# Patient Record
Sex: FEMALE | Race: WHITE | NOT HISPANIC OR LATINO | Employment: FULL TIME | URBAN - METROPOLITAN AREA
[De-identification: names, ages, dates, MRNs, and addresses within clinical notes are randomized per-mention and may not be internally consistent; named-entity substitution may affect disease eponyms.]

---

## 2017-04-14 ENCOUNTER — APPOINTMENT (OUTPATIENT)
Dept: WOMENS IMAGING | Facility: HOSPITAL | Age: 62
End: 2017-04-14

## 2017-04-14 PROCEDURE — 77063 BREAST TOMOSYNTHESIS BI: CPT | Performed by: RADIOLOGY

## 2017-04-14 PROCEDURE — 77067 SCR MAMMO BI INCL CAD: CPT | Performed by: RADIOLOGY

## 2017-10-06 ENCOUNTER — OFFICE VISIT (OUTPATIENT)
Dept: INTERNAL MEDICINE | Facility: CLINIC | Age: 62
End: 2017-10-06

## 2017-10-06 VITALS
RESPIRATION RATE: 16 BRPM | WEIGHT: 124 LBS | HEART RATE: 77 BPM | TEMPERATURE: 98.3 F | DIASTOLIC BLOOD PRESSURE: 52 MMHG | SYSTOLIC BLOOD PRESSURE: 102 MMHG | OXYGEN SATURATION: 92 % | HEIGHT: 66 IN | BODY MASS INDEX: 19.93 KG/M2

## 2017-10-06 DIAGNOSIS — E55.9 VITAMIN D DEFICIENCY: ICD-10-CM

## 2017-10-06 DIAGNOSIS — Z11.59 NEED FOR HEPATITIS C SCREENING TEST: ICD-10-CM

## 2017-10-06 DIAGNOSIS — Z12.11 SCREEN FOR COLON CANCER: ICD-10-CM

## 2017-10-06 DIAGNOSIS — Z00.00 ENCOUNTER FOR ANNUAL HEALTH EXAMINATION: Primary | ICD-10-CM

## 2017-10-06 LAB
BILIRUB BLD-MCNC: NEGATIVE MG/DL
CLARITY, POC: CLEAR
COLOR UR: YELLOW
GLUCOSE UR STRIP-MCNC: NEGATIVE MG/DL
KETONES UR QL: NEGATIVE
LEUKOCYTE EST, POC: NEGATIVE
NITRITE UR-MCNC: NEGATIVE MG/ML
PH UR: 6 [PH] (ref 5–8)
PROT UR STRIP-MCNC: NEGATIVE MG/DL
RBC # UR STRIP: NEGATIVE /UL
SP GR UR: 1.02 (ref 1–1.03)
UROBILINOGEN UR QL: NORMAL

## 2017-10-06 PROCEDURE — 99396 PREV VISIT EST AGE 40-64: CPT | Performed by: INTERNAL MEDICINE

## 2017-10-06 PROCEDURE — 81003 URINALYSIS AUTO W/O SCOPE: CPT | Performed by: INTERNAL MEDICINE

## 2017-10-06 NOTE — PROGRESS NOTES
Subjective   Sonia Basilio is a 62 y.o. female.     Chief Complaint   Patient presents with   • Annual Exam         HPI Comments: In for annual preventative exam.  Overall she feels pretty well.  Energy is good.  She still little lagged from her trip to [place].  He exercises daily either walking or bicycling.  Sleeps about 6 hours per night.  Somewhat interrupted and she is usually up twice.  Occasional hot flashes.       The following portions of the patient's history were reviewed and updated as appropriate: allergies, current medications, past social history and problem list.    HISTORY  Outpatient Prescriptions Marked as Taking for the 10/6/17 encounter (Office Visit) with Edwadr Maloney MD   Medication Sig Dispense Refill   • Cholecalciferol (VITAMIN D3) 5000 UNITS capsule capsule Take 5,000 Units by mouth daily.     • Estradiol (ESTROGEL) 0.75 MG/1.25 GM (0.06%) topical gel Place 1.25 g on the skin daily.     • Multiple Vitamins-Minerals (MULTIVITAMIN & MINERAL PO) Take  by mouth daily.     • Omega-3 Fatty Acids (FISH OIL) 1000 MG capsule capsule Take  by mouth daily with breakfast.     • progesterone (PROMETRIUM) 200 MG capsule Take 200 mg by mouth daily.       Social History     Social History   • Marital status:      Spouse name: N/A   • Number of children: N/A   • Years of education: N/A     Occupational History   • Not on file.     Social History Main Topics   • Smoking status: Never Smoker   • Smokeless tobacco: Not on file   • Alcohol use Not on file      Comment: RARE   • Drug use: Not on file   • Sexual activity: Not on file     Other Topics Concern   • Not on file     Social History Narrative     Family History   Problem Relation Age of Onset   • Stroke Mother    • Dementia Mother    • Atrial fibrillation Sister    • Hyperlipidemia Sister    • Heart disease Sister    • Alcohol abuse Son      Past Medical History:   Diagnosis Date   • Allergic rhinitis    • H/O degenerative disc disease     • Medication management    • MVP (mitral valve prolapse)    • Osteopenia    • Physical exam, annual    • Sciatica    • Vitamin D deficiency      Past Surgical History:   Procedure Laterality Date   • BUNIONECTOMY Right 06/2015   • DILATATION AND CURETTAGE     • FOOT SURGERY  06/13/2016       Review of Systems   Constitutional: Negative for appetite change, chills, fatigue and fever.   HENT: Negative for congestion, ear pain, hearing loss, nosebleeds, postnasal drip, rhinorrhea, sinus pressure and trouble swallowing.    Eyes: Negative for pain, itching and visual disturbance.   Respiratory: Negative for cough, chest tightness, shortness of breath and wheezing.    Cardiovascular: Negative for chest pain, palpitations and leg swelling.   Gastrointestinal: Negative for abdominal pain, anal bleeding, constipation, diarrhea, nausea, rectal pain and vomiting.   Endocrine: Negative for cold intolerance, heat intolerance and polyuria.   Genitourinary: Negative for difficulty urinating, dysuria, flank pain, frequency, hematuria and urgency.   Musculoskeletal: Positive for arthralgias. Negative for back pain (fingers) and myalgias.   Skin: Negative for rash.   Allergic/Immunologic: Negative for environmental allergies.   Neurological: Negative for dizziness, syncope, speech difficulty, weakness, light-headedness, numbness and headaches.   Hematological: Does not bruise/bleed easily.   Psychiatric/Behavioral: Negative for agitation, confusion and sleep disturbance. The patient is not nervous/anxious.        Objective   Vitals:    10/06/17 1422   BP: 102/52   Pulse: 77   Resp: 16   Temp: 98.3 °F (36.8 °C)   SpO2: 92%      Last Weight    10/06/17  1422   Weight: 124 lb (56.2 kg)    [unfilled]  Body mass index is 20.01 kg/(m^2).      Physical Exam   Constitutional: She is oriented to person, place, and time. She appears well-developed and well-nourished.   HENT:   Head: Normocephalic and atraumatic.   Right Ear: External ear  normal.   Left Ear: External ear normal.   Nose: Nose normal.   Mouth/Throat: Oropharynx is clear and moist.   Eyes: Conjunctivae and EOM are normal. Pupils are equal, round, and reactive to light.   Neck: Normal range of motion. Neck supple. No JVD present. No thyromegaly present.   Cardiovascular: Normal rate, regular rhythm, normal heart sounds and intact distal pulses.  Exam reveals no gallop.    No murmur heard.  Pulmonary/Chest: Effort normal and breath sounds normal. No respiratory distress. She has no wheezes. She has no rales.   Abdominal: Soft. Bowel sounds are normal. She exhibits no distension and no mass. There is no tenderness. There is no guarding. No hernia.   Musculoskeletal: Normal range of motion. She exhibits no edema.   Lymphadenopathy:     She has no cervical adenopathy.   Neurological: She is alert and oriented to person, place, and time. She displays normal reflexes. No cranial nerve deficit. Coordination normal.   Skin: Skin is warm and dry.   Psychiatric: She has a normal mood and affect. Her behavior is normal. Judgment and thought content normal.   Nursing note and vitals reviewed.        Problem List Items Addressed This Visit     None      Visit Diagnoses     Encounter for annual health examination    -  Primary    Relevant Orders    POCT urinalysis dipstick, automated (Completed)        Assessment/Plan   In for annual exam today.  Overall she looks great.  Annual labs with health screen included lipids and glucose.   She exercises daily mainly with walking.  We'll check a CBC CMP lipids urinalysis and vitamin D today.  Hepatitis C antibody.  Follow-up 1 year.  Due for colonoscopy.  Recommended zoster today.  Follow-up one year with annual preventative exam.  She has occasional periods where her heart seems to beat harder faster.  With her mitral valve prolapse this is likely atrial arrhythmias.  Advised we can start her on the beta blocker she would like or symptoms become  karen.         Dragon disclaimer:   Much of this encounter note is an electronic transcription/translation of spoken language to printed text. The electronic translation of spoken language may permit erroneous, or at times, nonsensical words or phrases to be inadvertently transcribed; Although I have reviewed the note for such errors, some may still exist.

## 2017-10-07 LAB
25(OH)D3+25(OH)D2 SERPL-MCNC: 72.8 NG/ML (ref 30–100)
ALBUMIN SERPL-MCNC: 4.5 G/DL (ref 3.5–5.2)
ALBUMIN/GLOB SERPL: 1.8 G/DL
ALP SERPL-CCNC: 56 U/L (ref 39–117)
ALT SERPL-CCNC: 14 U/L (ref 1–33)
AST SERPL-CCNC: 18 U/L (ref 1–32)
BASOPHILS # BLD AUTO: 0.02 10*3/MM3 (ref 0–0.2)
BASOPHILS NFR BLD AUTO: 0.3 % (ref 0–1.5)
BILIRUB SERPL-MCNC: 0.6 MG/DL (ref 0.1–1.2)
BUN SERPL-MCNC: 15 MG/DL (ref 8–23)
BUN/CREAT SERPL: 18.3 (ref 7–25)
CALCIUM SERPL-MCNC: 9.6 MG/DL (ref 8.6–10.5)
CHLORIDE SERPL-SCNC: 98 MMOL/L (ref 98–107)
CO2 SERPL-SCNC: 24.4 MMOL/L (ref 22–29)
CREAT SERPL-MCNC: 0.82 MG/DL (ref 0.57–1)
EOSINOPHIL # BLD AUTO: 0.07 10*3/MM3 (ref 0–0.7)
EOSINOPHIL NFR BLD AUTO: 1.1 % (ref 0.3–6.2)
ERYTHROCYTE [DISTWIDTH] IN BLOOD BY AUTOMATED COUNT: 12.9 % (ref 11.7–13)
GLOBULIN SER CALC-MCNC: 2.5 GM/DL
GLUCOSE SERPL-MCNC: 85 MG/DL (ref 65–99)
HCT VFR BLD AUTO: 40.9 % (ref 35.6–45.5)
HCV AB S/CO SERPL IA: <0.1 S/CO RATIO (ref 0–0.9)
HGB BLD-MCNC: 13.2 G/DL (ref 11.9–15.5)
IMM GRANULOCYTES # BLD: 0 10*3/MM3 (ref 0–0.03)
IMM GRANULOCYTES NFR BLD: 0 % (ref 0–0.5)
LYMPHOCYTES # BLD AUTO: 1.71 10*3/MM3 (ref 0.9–4.8)
LYMPHOCYTES NFR BLD AUTO: 28 % (ref 19.6–45.3)
MCH RBC QN AUTO: 30.6 PG (ref 26.9–32)
MCHC RBC AUTO-ENTMCNC: 32.3 G/DL (ref 32.4–36.3)
MCV RBC AUTO: 94.9 FL (ref 80.5–98.2)
MONOCYTES # BLD AUTO: 0.67 10*3/MM3 (ref 0.2–1.2)
MONOCYTES NFR BLD AUTO: 11 % (ref 5–12)
NEUTROPHILS # BLD AUTO: 3.64 10*3/MM3 (ref 1.9–8.1)
NEUTROPHILS NFR BLD AUTO: 59.6 % (ref 42.7–76)
PLATELET # BLD AUTO: 213 10*3/MM3 (ref 140–500)
POTASSIUM SERPL-SCNC: 4 MMOL/L (ref 3.5–5.2)
PROT SERPL-MCNC: 7 G/DL (ref 6–8.5)
RBC # BLD AUTO: 4.31 10*6/MM3 (ref 3.9–5.2)
SODIUM SERPL-SCNC: 139 MMOL/L (ref 136–145)
WBC # BLD AUTO: 6.11 10*3/MM3 (ref 4.5–10.7)

## 2017-12-29 ENCOUNTER — OFFICE VISIT (OUTPATIENT)
Dept: INTERNAL MEDICINE | Facility: CLINIC | Age: 62
End: 2017-12-29

## 2017-12-29 VITALS
HEART RATE: 81 BPM | RESPIRATION RATE: 16 BRPM | BODY MASS INDEX: 19.61 KG/M2 | WEIGHT: 122 LBS | OXYGEN SATURATION: 95 % | SYSTOLIC BLOOD PRESSURE: 112 MMHG | TEMPERATURE: 99.1 F | HEIGHT: 66 IN | DIASTOLIC BLOOD PRESSURE: 62 MMHG

## 2017-12-29 DIAGNOSIS — R68.89 FLU-LIKE SYMPTOMS: Primary | ICD-10-CM

## 2017-12-29 PROCEDURE — 99213 OFFICE O/P EST LOW 20 MIN: CPT | Performed by: INTERNAL MEDICINE

## 2017-12-29 RX ORDER — BENZONATATE 200 MG/1
200 CAPSULE ORAL 3 TIMES DAILY PRN
Qty: 30 CAPSULE | Refills: 0 | Status: SHIPPED | OUTPATIENT
Start: 2017-12-29 | End: 2018-01-31

## 2017-12-29 NOTE — PROGRESS NOTES
Subjective   Sonia Basilio is a 62 y.o. female.     Chief Complaint   Patient presents with   • Cough   • Nasal Congestion         Cough   This is a new problem. The current episode started in the past 7 days. The problem has been unchanged. The problem occurs every few hours. The cough is productive of sputum. Associated symptoms include chills, a fever, headaches, myalgias, nasal congestion, postnasal drip, rhinorrhea and a sore throat. Pertinent negatives include no shortness of breath. Nothing aggravates the symptoms. She has tried OTC cough suppressant for the symptoms. The treatment provided no relief. There is no history of asthma, COPD or emphysema.        The following portions of the patient's history were reviewed and updated as appropriate: allergies, current medications, past social history and problem list.    Outpatient Prescriptions Marked as Taking for the 12/29/17 encounter (Office Visit) with Edward Maloney MD   Medication Sig Dispense Refill   • Cholecalciferol (VITAMIN D3) 5000 UNITS capsule capsule Take 5,000 Units by mouth daily.     • Estradiol (ESTROGEL) 0.75 MG/1.25 GM (0.06%) topical gel Place 1.25 g on the skin daily.     • Multiple Vitamins-Minerals (MULTIVITAMIN & MINERAL PO) Take  by mouth daily.     • Omega-3 Fatty Acids (FISH OIL) 1000 MG capsule capsule Take  by mouth daily with breakfast.     • progesterone (PROMETRIUM) 200 MG capsule Take 200 mg by mouth daily.         Review of Systems   Constitutional: Positive for chills and fever.   HENT: Positive for postnasal drip, rhinorrhea and sore throat.    Respiratory: Positive for cough. Negative for shortness of breath.    Musculoskeletal: Positive for myalgias.   Neurological: Positive for headaches.       Objective   Vitals:    12/29/17 1145   BP: 112/62   Pulse: 81   Resp: 16   Temp: 99.1 °F (37.3 °C)   SpO2: 95%      Last Weight    12/29/17  1145   Weight: 55.3 kg (122 lb)    [unfilled]  Body mass index is 19.7  kg/(m^2).      Physical Exam   Constitutional: She appears well-developed and well-nourished.   HENT:   Head: Normocephalic and atraumatic.   Right Ear: External ear normal.   Left Ear: External ear normal.   Nose: Nose normal.   Mouth/Throat: Oropharynx is clear and moist.   Eyes: Conjunctivae are normal. Pupils are equal, round, and reactive to light.   Pulmonary/Chest: Effort normal and breath sounds normal. No respiratory distress. She has no wheezes. She has no rales.   Skin: Skin is warm and dry.         Problem List Items Addressed This Visit     None      Visit Diagnoses     Flu-like symptoms    -  Primary        Assessment/Plan   In with 1 week illness.  Cough.  Head congestion.  Sore throat.  Headache and body aches.  This sounds like a flulike illness.  Been taking TheraFlu at home.  We'll continue with OTCs.  Tessalon cough perles when necessary.  Reminded her that she is due for her colonoscopy.         Dragon disclaimer:   Much of this encounter note is an electronic transcription/translation of spoken language to printed text. The electronic translation of spoken language may permit erroneous, or at times, nonsensical words or phrases to be inadvertently transcribed; Although I have reviewed the note for such errors, some may still exist.

## 2018-01-30 ENCOUNTER — PREP FOR SURGERY (OUTPATIENT)
Dept: OTHER | Facility: HOSPITAL | Age: 63
End: 2018-01-30

## 2018-01-30 DIAGNOSIS — Z12.11 SCREENING FOR COLON CANCER: Primary | ICD-10-CM

## 2018-01-31 ENCOUNTER — OFFICE VISIT (OUTPATIENT)
Dept: INTERNAL MEDICINE | Facility: CLINIC | Age: 63
End: 2018-01-31

## 2018-01-31 VITALS
HEART RATE: 78 BPM | HEIGHT: 66 IN | OXYGEN SATURATION: 97 % | WEIGHT: 121.8 LBS | DIASTOLIC BLOOD PRESSURE: 60 MMHG | SYSTOLIC BLOOD PRESSURE: 96 MMHG | RESPIRATION RATE: 16 BRPM | TEMPERATURE: 97.7 F | BODY MASS INDEX: 19.58 KG/M2

## 2018-01-31 DIAGNOSIS — B07.0 PLANTAR WART: Primary | ICD-10-CM

## 2018-01-31 PROCEDURE — 99212 OFFICE O/P EST SF 10 MIN: CPT | Performed by: INTERNAL MEDICINE

## 2018-01-31 NOTE — PROGRESS NOTES
Subjective   Sonia Basiilo is a 62 y.o. female.     Chief Complaint   Patient presents with   • Callouses         HPI Comments: In with a wartlike lesion on the bullae or pad of the right second toe.  Symptoms for over one month.  She's had previous bunion surgery on that foot.  Wears a spacer between the great toe and the second toe.       The following portions of the patient's history were reviewed and updated as appropriate: allergies, current medications, past social history and problem list.    Outpatient Prescriptions Marked as Taking for the 1/31/18 encounter (Office Visit) with Edward Maloney MD   Medication Sig Dispense Refill   • Cholecalciferol (VITAMIN D3) 5000 UNITS capsule capsule Take 5,000 Units by mouth daily.     • Estradiol (ESTROGEL) 0.75 MG/1.25 GM (0.06%) topical gel Place 1.25 g on the skin daily.     • Multiple Vitamins-Minerals (MULTIVITAMIN & MINERAL PO) Take  by mouth daily.     • Omega-3 Fatty Acids (FISH OIL) 1000 MG capsule capsule Take  by mouth daily with breakfast.     • progesterone (PROMETRIUM) 200 MG capsule Take 200 mg by mouth daily.         Review of Systems   Constitutional: Negative for chills and fever.   Skin: Positive for rash.       Objective   Vitals:    01/31/18 1017   BP: 96/60   Pulse: 78   Resp: 16   Temp: 97.7 °F (36.5 °C)   SpO2: 97%      Last Weight    01/31/18  1017   Weight: 55.2 kg (121 lb 12.8 oz)    [unfilled]  Body mass index is 19.67 kg/(m^2).      Physical Exam   Constitutional: She appears well-developed and well-nourished.      Skin Integrity  -  She has callous right foot..  Skin: Skin is warm and dry.         Problem List Items Addressed This Visit     None      Visit Diagnoses     Plantar wart    -  Primary        Assessment/Plan   In with a callus-like lesion on the volar pad of the right second toe proximally.  Really not an area that should be seeing friction.  I wonder if this is a plantar wart.  Does have some calluses on the MT heads  and medial to the right great toes along with dryness I think that's a separate issue.  She's had previous bunion surgery.  Wears a spacer between the great toe and second toe.  Patient uses her dermatologist for evaluation of whether this is a wart.  She has no appointment in about 4 weeks.  She also has no appointment for her colonoscopy.         Dragon disclaimer:   Much of this encounter note is an electronic transcription/translation of spoken language to printed text. The electronic translation of spoken language may permit erroneous, or at times, nonsensical words or phrases to be inadvertently transcribed; Although I have reviewed the note for such errors, some may still exist.

## 2018-02-01 PROBLEM — Z12.11 SCREENING FOR COLON CANCER: Status: ACTIVE | Noted: 2018-02-01

## 2018-04-03 ENCOUNTER — ANESTHESIA (OUTPATIENT)
Dept: GASTROENTEROLOGY | Facility: HOSPITAL | Age: 63
End: 2018-04-03

## 2018-04-03 ENCOUNTER — HOSPITAL ENCOUNTER (OUTPATIENT)
Facility: HOSPITAL | Age: 63
Setting detail: HOSPITAL OUTPATIENT SURGERY
Discharge: HOME | End: 2018-04-03
Attending: INTERNAL MEDICINE | Admitting: INTERNAL MEDICINE

## 2018-04-03 ENCOUNTER — ANESTHESIA EVENT (OUTPATIENT)
Dept: GASTROENTEROLOGY | Facility: HOSPITAL | Age: 63
End: 2018-04-03

## 2018-04-03 VITALS
HEART RATE: 72 BPM | DIASTOLIC BLOOD PRESSURE: 62 MMHG | RESPIRATION RATE: 16 BRPM | OXYGEN SATURATION: 100 % | WEIGHT: 126 LBS | SYSTOLIC BLOOD PRESSURE: 118 MMHG | HEIGHT: 67 IN | TEMPERATURE: 98 F | BODY MASS INDEX: 19.78 KG/M2

## 2018-04-03 DIAGNOSIS — Z12.11 SCREENING FOR COLON CANCER: ICD-10-CM

## 2018-04-03 PROCEDURE — S0260 H&P FOR SURGERY: HCPCS | Performed by: INTERNAL MEDICINE

## 2018-04-03 PROCEDURE — 45381 COLONOSCOPY SUBMUCOUS NJX: CPT | Performed by: INTERNAL MEDICINE

## 2018-04-03 PROCEDURE — 88305 TISSUE EXAM BY PATHOLOGIST: CPT | Performed by: INTERNAL MEDICINE

## 2018-04-03 PROCEDURE — 45380 COLONOSCOPY AND BIOPSY: CPT | Performed by: INTERNAL MEDICINE

## 2018-04-03 PROCEDURE — 25010000002 PROPOFOL 10 MG/ML EMULSION: Performed by: NURSE ANESTHETIST, CERTIFIED REGISTERED

## 2018-04-03 PROCEDURE — 45385 COLONOSCOPY W/LESION REMOVAL: CPT | Performed by: INTERNAL MEDICINE

## 2018-04-03 RX ORDER — PROPOFOL 10 MG/ML
VIAL (ML) INTRAVENOUS AS NEEDED
Status: DISCONTINUED | OUTPATIENT
Start: 2018-04-03 | End: 2018-04-03 | Stop reason: SURG

## 2018-04-03 RX ORDER — PROPOFOL 10 MG/ML
VIAL (ML) INTRAVENOUS CONTINUOUS PRN
Status: DISCONTINUED | OUTPATIENT
Start: 2018-04-03 | End: 2018-04-03 | Stop reason: SURG

## 2018-04-03 RX ORDER — SODIUM CHLORIDE 0.9 % (FLUSH) 0.9 %
1-10 SYRINGE (ML) INJECTION AS NEEDED
Status: DISCONTINUED | OUTPATIENT
Start: 2018-04-03 | End: 2018-04-03 | Stop reason: HOSPADM

## 2018-04-03 RX ORDER — SODIUM CHLORIDE, SODIUM LACTATE, POTASSIUM CHLORIDE, CALCIUM CHLORIDE 600; 310; 30; 20 MG/100ML; MG/100ML; MG/100ML; MG/100ML
30 INJECTION, SOLUTION INTRAVENOUS CONTINUOUS PRN
Status: DISCONTINUED | OUTPATIENT
Start: 2018-04-03 | End: 2018-04-03 | Stop reason: HOSPADM

## 2018-04-03 RX ADMIN — PROPOFOL 200 MCG/KG/MIN: 10 INJECTION, EMULSION INTRAVENOUS at 08:33

## 2018-04-03 RX ADMIN — PROPOFOL 50 MG: 10 INJECTION, EMULSION INTRAVENOUS at 08:33

## 2018-04-03 RX ADMIN — SODIUM CHLORIDE, POTASSIUM CHLORIDE, SODIUM LACTATE AND CALCIUM CHLORIDE 30 ML/HR: 600; 310; 30; 20 INJECTION, SOLUTION INTRAVENOUS at 07:59

## 2018-04-03 RX ADMIN — SODIUM CHLORIDE, POTASSIUM CHLORIDE, SODIUM LACTATE AND CALCIUM CHLORIDE: 600; 310; 30; 20 INJECTION, SOLUTION INTRAVENOUS at 08:30

## 2018-04-03 NOTE — ANESTHESIA POSTPROCEDURE EVALUATION
"Patient: Sonia Basilio    Procedure Summary     Date:  04/03/18 Room / Location:  [facility] ENDOSCOPY 10 /  [facility]    Anesthesia Start:  0830 Anesthesia Stop:  0902    Procedure:  COLONOSCOPY WITH POLYPECTOMY (HOT SNARE) (N/A ) Diagnosis:       Screening for colon cancer      (Screening for colon cancer [Z12.11])    Surgeon:  Naina Pandya MD Provider:  Yeyo Rosario MD    Anesthesia Type:  MAC ASA Status:  2          Anesthesia Type: MAC  Last vitals  BP   126/66 (04/03/18 0749)   Temp   36.4 °C (97.5 °F) (04/03/18 0749)   Pulse   59 (04/03/18 0749)   Resp   18 (04/03/18 0749)     SpO2   100 % (04/03/18 0749)     Post Anesthesia Care and Evaluation    Patient location during evaluation: PACU  Patient participation: complete - patient participated  Level of consciousness: awake  Pain score: 0  Pain management: adequate  Airway patency: patent  Anesthetic complications: No anesthetic complications    Cardiovascular status: acceptable  Respiratory status: acceptable  Hydration status: acceptable    Comments: Blood pressure 126/66, pulse 59, temperature 36.4 °C (97.5 °F), temperature source Oral, resp. rate 18, height 170.2 cm (67\"), weight 57.2 kg (126 lb), SpO2 100 %.    No anesthesia care post op    "

## 2018-04-03 NOTE — ANESTHESIA PREPROCEDURE EVALUATION
Anesthesia Evaluation     Patient summary reviewed                Airway   Mallampati: III  TM distance: >3 FB  Neck ROM: full  No difficulty expected  Dental - normal exam     Pulmonary     breath sounds clear to auscultation  Cardiovascular   Exercise tolerance: good (4-7 METS)    Rhythm: regular  Rate: normal        Neuro/Psych  GI/Hepatic/Renal/Endo      Musculoskeletal     Abdominal    Substance History      OB/GYN          Other                      Anesthesia Plan    ASA 2     MAC     intravenous induction   Anesthetic plan and risks discussed with patient.

## 2018-04-03 NOTE — H&P
[facility]  Pre Procedure History & Physical    Chief Complaint:   Screening - FH polyps, second degree relative with CRC    Subjective     HPI:   64 yo WF for screening c/s - her paternal GM had CRC in her 70s.  Her father had polyps.  She denies any GI symptoms.  Last c/s 2006 was nml    Past Medical History:   Past Medical History:   Diagnosis Date   • Allergic rhinitis    • H/O degenerative disc disease    • Medication management    • MVP (mitral valve prolapse)    • Osteopenia    • Physical exam, annual    • Sciatica    • Vitamin D deficiency        Past Surgical History:  Past Surgical History:   Procedure Laterality Date   • BUNIONECTOMY Right 06/2015   • DILATATION AND CURETTAGE         Family History:  Family History   Problem Relation Age of Onset   • Stroke Mother    • Dementia Mother    • Atrial fibrillation Sister    • Hyperlipidemia Sister    • Heart disease Sister    • Alcohol abuse Son    • Colon cancer Paternal Grandmother        Social History:   reports that she has never smoked. She has never used smokeless tobacco. She reports that she does not drink alcohol or use drugs.    Medications:   Prescriptions Prior to Admission   Medication Sig Dispense Refill Last Dose   • Cholecalciferol (VITAMIN D3) 5000 UNITS capsule capsule Take 5,000 Units by mouth daily.   Past Week at Unknown time   • Estradiol (ESTROGEL) 0.75 MG/1.25 GM (0.06%) topical gel Place 1.25 g on the skin daily.   Past Week at Unknown time   • Multiple Vitamins-Minerals (MULTIVITAMIN & MINERAL PO) Take  by mouth daily.   Past Week at Unknown time   • Omega-3 Fatty Acids (FISH OIL) 1000 MG capsule capsule Take  by mouth daily with breakfast.   Past Week at Unknown time   • progesterone (PROMETRIUM) 200 MG capsule Take 200 mg by mouth daily.   Past Week at Unknown time       Allergies:  Sulfa antibiotics    ROS:    Pertinent items are noted in HPI, all other systems reviewed and negative     Objective     Blood  "pressure 126/66, pulse 59, temperature 97.5 °F (36.4 °C), temperature source Oral, resp. rate 18, height 170.2 cm (67\"), weight 57.2 kg (126 lb), SpO2 100 %.    Physical Exam   Constitutional: Pt is oriented to person, place, and time and well-developed, well-nourished, and in no distress.   Mouth/Throat: Oropharynx is clear and moist.   Neck: Normal range of motion.   Cardiovascular: Normal rate, regular rhythm   Pulmonary/Chest: Effort normal  Abdominal: Soft. Nontender  Skin: Skin is warm and dry.   Psychiatric: Mood, memory, affect and judgment normal.     Assessment/Plan     Diagnosis:  Screening - FH polyps, second degree relative with CRC    Anticipated Surgical Procedure:  colonoscopy    The risks, benefits, and alternatives of this procedure have been discussed with the patient or the responsible party- the patient understands and agrees to proceed.                                                            "

## 2018-04-04 LAB
LAB AP CASE REPORT: NORMAL
Lab: NORMAL
PATH REPORT.FINAL DX SPEC: NORMAL
PATH REPORT.GROSS SPEC: NORMAL

## 2018-04-16 ENCOUNTER — TELEPHONE (OUTPATIENT)
Dept: GASTROENTEROLOGY | Facility: CLINIC | Age: 63
End: 2018-04-16

## 2018-04-20 ENCOUNTER — APPOINTMENT (OUTPATIENT)
Dept: WOMENS IMAGING | Facility: HOSPITAL | Age: 63
End: 2018-04-20

## 2018-04-20 PROCEDURE — 77067 SCR MAMMO BI INCL CAD: CPT | Performed by: RADIOLOGY

## 2018-05-15 ENCOUNTER — OFFICE VISIT (OUTPATIENT)
Dept: INTERNAL MEDICINE | Facility: CLINIC | Age: 63
End: 2018-05-15

## 2018-05-15 VITALS
BODY MASS INDEX: 19.3 KG/M2 | WEIGHT: 123 LBS | TEMPERATURE: 98.4 F | OXYGEN SATURATION: 96 % | DIASTOLIC BLOOD PRESSURE: 52 MMHG | HEART RATE: 81 BPM | SYSTOLIC BLOOD PRESSURE: 84 MMHG | HEIGHT: 67 IN | RESPIRATION RATE: 16 BRPM

## 2018-05-15 DIAGNOSIS — R35.0 URINARY FREQUENCY: Primary | ICD-10-CM

## 2018-05-15 LAB
BILIRUB BLD-MCNC: NEGATIVE MG/DL
CLARITY, POC: CLEAR
COLOR UR: YELLOW
GLUCOSE UR STRIP-MCNC: NEGATIVE MG/DL
KETONES UR QL: NEGATIVE
LEUKOCYTE EST, POC: ABNORMAL
NITRITE UR-MCNC: NEGATIVE MG/ML
PH UR: 6 [PH] (ref 5–8)
PROT UR STRIP-MCNC: NEGATIVE MG/DL
RBC # UR STRIP: ABNORMAL /UL
SP GR UR: 1.01 (ref 1–1.03)
UROBILINOGEN UR QL: ABNORMAL

## 2018-05-15 PROCEDURE — 81003 URINALYSIS AUTO W/O SCOPE: CPT | Performed by: INTERNAL MEDICINE

## 2018-05-15 PROCEDURE — 99213 OFFICE O/P EST LOW 20 MIN: CPT | Performed by: INTERNAL MEDICINE

## 2018-05-15 RX ORDER — CEPHALEXIN 500 MG/1
500 CAPSULE ORAL 3 TIMES DAILY
Qty: 15 CAPSULE | Refills: 0 | Status: SHIPPED | OUTPATIENT
Start: 2018-05-15 | End: 2018-07-16

## 2018-05-15 NOTE — PROGRESS NOTES
Subjective   Sonia Basilio is a 63 y.o. female.     Chief Complaint   Patient presents with   • Difficulty Urinating     urinary frequency last night         Difficulty Urinating   This is a new problem. The current episode started yesterday. The problem occurs intermittently. The problem has been unchanged. Associated symptoms include urinary symptoms. Pertinent negatives include no chills, fever, nausea or vomiting. Nothing aggravates the symptoms. She has tried nothing for the symptoms.        The following portions of the patient's history were reviewed and updated as appropriate: allergies, current medications, past social history and problem list.    Outpatient Prescriptions Marked as Taking for the 5/15/18 encounter (Office Visit) with Edward Maloney MD   Medication Sig Dispense Refill   • Cholecalciferol (VITAMIN D3) 5000 UNITS capsule capsule Take 5,000 Units by mouth daily.     • Estradiol (ESTROGEL) 0.75 MG/1.25 GM (0.06%) topical gel Place 1.25 g on the skin daily.     • Multiple Vitamins-Minerals (MULTIVITAMIN & MINERAL PO) Take  by mouth daily.     • Omega-3 Fatty Acids (FISH OIL) 1000 MG capsule capsule Take  by mouth daily with breakfast.     • progesterone (PROMETRIUM) 200 MG capsule Take 200 mg by mouth daily.         Review of Systems   Constitutional: Negative for chills and fever.   Gastrointestinal: Negative for nausea and vomiting.   Genitourinary: Positive for difficulty urinating, dysuria, frequency and urgency.       Objective   Vitals:    05/15/18 1047   BP: (!) 84/52   Pulse: 81   Resp: 16   Temp: 98.4 °F (36.9 °C)   SpO2: 96%      1    05/15/18  1047   Weight: 55.8 kg (123 lb)    [unfilled]  Body mass index is 19.26 kg/m².      Physical Exam   Constitutional: She appears well-developed and well-nourished.   HENT:   Head: Normocephalic and atraumatic.   Pulmonary/Chest: Effort normal and breath sounds normal.   Abdominal: Soft. Bowel sounds are normal. She exhibits no  distension. There is no tenderness. There is no rebound and no guarding.   Skin: Skin is warm and dry.         Problem List Items Addressed This Visit     None      Visit Diagnoses     Urinary frequency    -  Primary    Relevant Orders    POCT urinalysis dipstick, automated (Completed)        Assessment/Plan   In with dysuria and urgency that began last night.  Had some gross hematuria.  Actually developed some bleeding hemorrhoids as well.  No history of UTIs in the past.  Urine today has 3+ blood and 3+ leukocytes.  Negative nitrates.  Nonetheless this is almost certainly hemorrhagic cystitis.  We'll treat with Keflex 500 mg 3 times a day for 5 days.  Repeat urinalysis in about 2 weeks.           .bmifollowup  Dragon disclaimer:   Much of this encounter note is an electronic transcription/translation of spoken language to printed text. The electronic translation of spoken language may permit erroneous, or at times, nonsensical words or phrases to be inadvertently transcribed; Although I have reviewed the note for such errors, some may still exist.

## 2018-07-16 ENCOUNTER — OFFICE VISIT (OUTPATIENT)
Dept: INTERNAL MEDICINE | Facility: CLINIC | Age: 63
End: 2018-07-16

## 2018-07-16 VITALS
HEIGHT: 67 IN | RESPIRATION RATE: 16 BRPM | TEMPERATURE: 98.1 F | DIASTOLIC BLOOD PRESSURE: 62 MMHG | SYSTOLIC BLOOD PRESSURE: 100 MMHG | WEIGHT: 124 LBS | HEART RATE: 66 BPM | BODY MASS INDEX: 19.46 KG/M2 | OXYGEN SATURATION: 96 %

## 2018-07-16 DIAGNOSIS — R53.83 FATIGUE, UNSPECIFIED TYPE: Primary | ICD-10-CM

## 2018-07-16 PROCEDURE — 99213 OFFICE O/P EST LOW 20 MIN: CPT | Performed by: INTERNAL MEDICINE

## 2018-07-16 RX ORDER — ACETAMINOPHEN 160 MG
2000 TABLET,DISINTEGRATING ORAL DAILY
Qty: 30 CAPSULE | Refills: 11 | Status: SHIPPED | OUTPATIENT
Start: 2018-07-16 | End: 2019-07-16

## 2018-07-16 NOTE — PATIENT INSTRUCTIONS
Exercising to Stay Healthy  Exercising regularly is important. It has many health benefits, such as:  · Improving your overall fitness, flexibility, and endurance.  · Increasing your bone density.  · Helping with weight control.  · Decreasing your body fat.  · Increasing your muscle strength.  · Reducing stress and tension.  · Improving your overall health.    In order to become healthy and stay healthy, it is recommended that you do moderate-intensity and vigorous-intensity exercise. You can tell that you are exercising at a moderate intensity if you have a higher heart rate and faster breathing, but you are still able to hold a conversation. You can tell that you are exercising at a vigorous intensity if you are breathing much harder and faster and cannot hold a conversation while exercising.  How often should I exercise?  Choose an activity that you enjoy and set realistic goals. Your health care provider can help you to make an activity plan that works for you. Exercise regularly as directed by your health care provider. This may include:  · Doing resistance training twice each week, such as:  ? Push-ups.  ? Sit-ups.  ? Lifting weights.  ? Using resistance bands.  · Doing a given intensity of exercise for a given amount of time. Choose from these options:  ? 150 minutes of moderate-intensity exercise every week.  ? 75 minutes of vigorous-intensity exercise every week.  ? A mix of moderate-intensity and vigorous-intensity exercise every week.    Children, pregnant women, people who are out of shape, people who are overweight, and older adults may need to consult a health care provider for individual recommendations. If you have any sort of medical condition, be sure to consult your health care provider before starting a new exercise program.  What are some exercise ideas?  Some moderate-intensity exercise ideas include:  · Walking at a rate of 1 mile in 15  minutes.  · Biking.  · Hiking.  · Golfing.  · Dancing.    Some vigorous-intensity exercise ideas include:  · Walking at a rate of at least 4.5 miles per hour.  · Jogging or running at a rate of 5 miles per hour.  · Biking at a rate of at least 10 miles per hour.  · Lap swimming.  · Roller-skating or in-line skating.  · Cross-country skiing.  · Vigorous competitive sports, such as football, basketball, and soccer.  · Jumping rope.  · Aerobic dancing.    What are some everyday activities that can help me to get exercise?  · Yard work, such as:  ? Pushing a .  ? Raking and bagging leaves.  · Washing and waxing your car.  · Pushing a stroller.  · Shoveling snow.  · Gardening.  · Washing windows or floors.  How can I be more active in my day-to-day activities?  · Use the stairs instead of the elevator.  · Take a walk during your lunch break.  · If you drive, park your car farther away from work or school.  · If you take public transportation, get off one stop early and walk the rest of the way.  · Make all of your phone calls while standing up and walking around.  · Get up, stretch, and walk around every 30 minutes throughout the day.  What guidelines should I follow while exercising?  · Do not exercise so much that you hurt yourself, feel dizzy, or get very short of breath.  · Consult your health care provider before starting a new exercise program.  · Wear comfortable clothes and shoes with good support.  · Drink plenty of water while you exercise to prevent dehydration or heat stroke. Body water is lost during exercise and must be replaced.  · Work out until you breathe faster and your heart beats faster.  This information is not intended to replace advice given to you by your health care provider. Make sure you discuss any questions you have with your health care provider.  Document Released: 01/20/2012 Document Revised: 05/25/2017 Document Reviewed: 05/21/2015  [employer] Patient Education © 2018  [employer]  BMI for Adults  Body mass index (BMI) is a number that is calculated from a person's weight and height. In most adults, the number is used to find how much of an adult's weight is made up of fat. BMI is not as accurate as a direct measure of body fat.  How is BMI calculated?  BMI is calculated by dividing weight in kilograms by height in meters squared. It can also be calculated by dividing weight in pounds by height in inches squared, then multiplying the resulting number by 703. Charts are available to help you find your BMI quickly and easily without doing this calculation.  How is BMI interpreted?  Health care professionals use BMI charts to identify whether an adult is underweight, at a normal weight, or overweight based on the following guidelines:  · Underweight: BMI less than 18.5.  · Normal weight: BMI between 18.5 and 24.9.  · Overweight: BMI between 25 and 29.9.  · Obese: BMI of 30 and above.    BMI is usually interpreted the same for males and females.  Weight includes both fat and muscle, so someone with a muscular build, such as an athlete, may have a BMI that is higher than 24.9. In cases like these, BMI may not accurately depict body fat. To determine if excess body fat is the cause of a BMI of 25 or higher, further assessments may need to be done by a health care provider.  Why is BMI a useful tool?  BMI is used to identify a possible weight problem that may be related to a medical problem or may increase the risk for medical problems. BMI can also be used to promote changes to reach a healthy weight.  This information is not intended to replace advice given to you by your health care provider. Make sure you discuss any questions you have with your health care provider.  Document Released: 08/29/2005 Document Revised: 04/27/2017 Document Reviewed: 05/15/2015  [employer] Patient Education © 2018 [employer]

## 2018-07-16 NOTE — PROGRESS NOTES
Subjective   Sonia Basilio is a 63 y.o. female.     Chief Complaint   Patient presents with   • Fatigue         Fatigue   This is a new problem. The current episode started more than 1 month ago. The problem occurs constantly. The problem has been gradually worsening. Associated symptoms include chest pain and fatigue. Pertinent negatives include no chills, coughing, fever, headaches, nausea, swollen glands or vomiting. She has tried nothing for the symptoms.        The following portions of the patient's history were reviewed and updated as appropriate: allergies, current medications, past social history and problem list.    Outpatient Prescriptions Marked as Taking for the 7/16/18 encounter (Office Visit) with Edward Maloney MD   Medication Sig Dispense Refill   • Cholecalciferol (VITAMIN D3) 5000 UNITS capsule capsule Take 5,000 Units by mouth daily.     • Estradiol (ESTROGEL) 0.75 MG/1.25 GM (0.06%) topical gel Place 1.25 g on the skin daily.     • Multiple Vitamins-Minerals (MULTIVITAMIN & MINERAL PO) Take  by mouth daily.     • progesterone (PROMETRIUM) 200 MG capsule Take 200 mg by mouth daily.         Review of Systems   Constitutional: Positive for fatigue. Negative for chills, fever and unexpected weight change.   Respiratory: Positive for shortness of breath. Negative for cough and wheezing.    Cardiovascular: Positive for chest pain and palpitations. Negative for leg swelling.   Gastrointestinal: Negative for constipation, diarrhea, nausea and vomiting.   Neurological: Negative for headaches.       Objective   Vitals:    07/16/18 0802   BP: 100/62   Pulse: 66   Resp: 16   Temp: 98.1 °F (36.7 °C)   SpO2: 96%      1    07/16/18  0802   Weight: 56.2 kg (124 lb)    [unfilled]  Body mass index is 19.42 kg/m².      Physical Exam   Constitutional: She appears well-developed and well-nourished. No distress.   HENT:   Head: Normocephalic and atraumatic.   Neck: Carotid bruit is not present. No  thyromegaly present.   Cardiovascular: Normal rate, regular rhythm, normal heart sounds and intact distal pulses.  Exam reveals no gallop.    No murmur heard.  Pulmonary/Chest: Effort normal and breath sounds normal. No respiratory distress. She has no wheezes. She has no rales.   Abdominal: Soft. Bowel sounds are normal. She exhibits no mass. There is no tenderness. There is no guarding.   Neurological: She displays abnormal reflex (delayed).         Problem List Items Addressed This Visit     None      Visit Diagnoses     Fatigue, unspecified type    -  Primary        Assessment/Plan   In today with fatigue.  Present for a few months.  Feels like she hits the wall.  Afternoon.  She feels pretty good in morning.  Tired later in the day.  Not particularly exercise-induced.  She can walk and if anything it feels better when she's walking.  She's on no new medications.  No change in weight.  No alteration in bowel habits.  Exam is unremarkable.  Reflexes are delayed.  We'll check some lab work today including CBC, CMP, TSH, free T4.  She has some left forearm pain that's in the proximal ulnar aspect is very nonspecific.  She has a lesion on her left back that is a typical seborrheic keratosis.  She is reassured about these 2 items.             .[email or web address]  Dragon disclaimer:   Much of this encounter note is an electronic transcription/translation of spoken language to printed text. The electronic translation of spoken language may permit erroneous, or at times, nonsensical words or phrases to be inadvertently transcribed; Although I have reviewed the note for such errors, some may still exist.

## 2018-07-17 LAB
ALBUMIN SERPL-MCNC: 4.5 G/DL (ref 3.6–4.8)
ALBUMIN/GLOB SERPL: 2 {RATIO} (ref 1.2–2.2)
ALP SERPL-CCNC: 57 IU/L (ref 39–117)
ALT SERPL-CCNC: 12 IU/L (ref 0–32)
AST SERPL-CCNC: 17 IU/L (ref 0–40)
BASOPHILS # BLD AUTO: 0.1 X10E3/UL (ref 0–0.2)
BASOPHILS NFR BLD AUTO: 1 %
BILIRUB SERPL-MCNC: 0.4 MG/DL (ref 0–1.2)
BUN SERPL-MCNC: 14 MG/DL (ref 8–27)
BUN/CREAT SERPL: 17 (ref 12–28)
CALCIUM SERPL-MCNC: 9.4 MG/DL (ref 8.7–10.3)
CHLORIDE SERPL-SCNC: 107 MMOL/L (ref 96–106)
CO2 SERPL-SCNC: 23 MMOL/L (ref 20–29)
CREAT SERPL-MCNC: 0.81 MG/DL (ref 0.57–1)
EOSINOPHIL # BLD AUTO: 0.2 X10E3/UL (ref 0–0.4)
EOSINOPHIL NFR BLD AUTO: 3 %
ERYTHROCYTE [DISTWIDTH] IN BLOOD BY AUTOMATED COUNT: 13.3 % (ref 12.3–15.4)
GLOBULIN SER CALC-MCNC: 2.2 G/DL (ref 1.5–4.5)
GLUCOSE SERPL-MCNC: 89 MG/DL (ref 65–99)
HCT VFR BLD AUTO: 41.1 % (ref 34–46.6)
HGB BLD-MCNC: 13 G/DL (ref 11.1–15.9)
IMM GRANULOCYTES # BLD: 0 X10E3/UL (ref 0–0.1)
IMM GRANULOCYTES NFR BLD: 0 %
LYMPHOCYTES # BLD AUTO: 2.1 X10E3/UL (ref 0.7–3.1)
LYMPHOCYTES NFR BLD AUTO: 33 %
MCH RBC QN AUTO: 30 PG (ref 26.6–33)
MCHC RBC AUTO-ENTMCNC: 31.6 G/DL (ref 31.5–35.7)
MCV RBC AUTO: 95 FL (ref 79–97)
MONOCYTES # BLD AUTO: 0.6 X10E3/UL (ref 0.1–0.9)
MONOCYTES NFR BLD AUTO: 10 %
NEUTROPHILS # BLD AUTO: 3.5 X10E3/UL (ref 1.4–7)
NEUTROPHILS NFR BLD AUTO: 53 %
PLATELET # BLD AUTO: 247 X10E3/UL (ref 150–379)
POTASSIUM SERPL-SCNC: 4.4 MMOL/L (ref 3.5–5.2)
PROT SERPL-MCNC: 6.7 G/DL (ref 6–8.5)
RBC # BLD AUTO: 4.33 X10E6/UL (ref 3.77–5.28)
SODIUM SERPL-SCNC: 146 MMOL/L (ref 134–144)
T4 FREE SERPL-MCNC: 1.13 NG/DL (ref 0.82–1.77)
TSH SERPL DL<=0.005 MIU/L-ACNC: 2.42 UIU/ML (ref 0.45–4.5)
WBC # BLD AUTO: 6.5 X10E3/UL (ref 3.4–10.8)

## 2018-08-08 NOTE — TELEPHONE ENCOUNTER
-Per patient wants to avoid invasive procedures or interventions    
Call to pt.  Advise per DR Pandya that colon polyps are benign.  Recommend repeat c/s in 5 yrs.  Pt verb understanding.    C/s for 4/3/23 placed in recall.  
Colon polyps are benign-recommend repeat colonoscopy in 5 years.  
(M6) obeys commands

## 2018-10-11 ENCOUNTER — OFFICE VISIT (OUTPATIENT)
Dept: INTERNAL MEDICINE | Facility: CLINIC | Age: 63
End: 2018-10-11

## 2018-10-11 VITALS
BODY MASS INDEX: 20.03 KG/M2 | SYSTOLIC BLOOD PRESSURE: 112 MMHG | RESPIRATION RATE: 16 BRPM | WEIGHT: 127.6 LBS | HEIGHT: 67 IN | TEMPERATURE: 98.1 F | OXYGEN SATURATION: 93 % | DIASTOLIC BLOOD PRESSURE: 60 MMHG | HEART RATE: 68 BPM

## 2018-10-11 DIAGNOSIS — M85.80 OSTEOPENIA, UNSPECIFIED LOCATION: ICD-10-CM

## 2018-10-11 DIAGNOSIS — I34.1 MVP (MITRAL VALVE PROLAPSE): ICD-10-CM

## 2018-10-11 DIAGNOSIS — E55.9 VITAMIN D DEFICIENCY: ICD-10-CM

## 2018-10-11 DIAGNOSIS — Z00.00 ENCOUNTER FOR PREVENTIVE HEALTH EXAMINATION: Primary | ICD-10-CM

## 2018-10-11 PROCEDURE — 99396 PREV VISIT EST AGE 40-64: CPT | Performed by: INTERNAL MEDICINE

## 2018-10-11 PROCEDURE — 93000 ELECTROCARDIOGRAM COMPLETE: CPT | Performed by: INTERNAL MEDICINE

## 2018-10-11 PROCEDURE — 81003 URINALYSIS AUTO W/O SCOPE: CPT | Performed by: INTERNAL MEDICINE

## 2018-10-11 NOTE — PROGRESS NOTES
Subjective   Sonia Basilio is a 63 y.o. female.     Chief Complaint   Patient presents with   • Annual Exam     physical         In for annual preventative exam.  Overall she feels pretty well.  Sleeps about 7 hours per night.  Somewhat interrupted and she is usually up twice.   Energy is good.  She exercises daily either walking or bicycling.  Diet is well-balanced.         The following portions of the patient's history were reviewed and updated as appropriate: allergies, current medications, past social history and problem list.    HISTORY  Outpatient Prescriptions Marked as Taking for the 10/11/18 encounter (Office Visit) with Edward Maloney MD   Medication Sig Dispense Refill   • Cholecalciferol (VITAMIN D3) 2000 units capsule Take 1 capsule by mouth Daily. 30 capsule 11   • Estradiol (ESTROGEL) 0.75 MG/1.25 GM (0.06%) topical gel Place 1.25 g on the skin daily.     • Multiple Vitamins-Minerals (MULTIVITAMIN & MINERAL PO) Take 1 tablet by mouth Daily.     • progesterone (PROMETRIUM) 200 MG capsule Take 200 mg by mouth daily.       Social History     Social History   • Marital status:      Spouse name: N/A   • Number of children: N/A   • Years of education: N/A     Occupational History   • Not on file.     Social History Main Topics   • Smoking status: Never Smoker   • Smokeless tobacco: Never Used   • Alcohol use No   • Drug use: No   • Sexual activity: Not on file     Other Topics Concern   • Not on file     Social History Narrative   • No narrative on file     Family History   Problem Relation Age of Onset   • Stroke Mother    • Dementia Mother    • Atrial fibrillation Sister    • Hyperlipidemia Sister    • Heart disease Sister    • Alcohol abuse Son    • Colon cancer Paternal Grandmother      Past Medical History:   Diagnosis Date   • Allergic rhinitis    • H/O degenerative disc disease    • Medication management    • MVP (mitral valve prolapse)    • Osteopenia    • Physical exam, annual    •  Sciatica    • Vitamin D deficiency      Past Surgical History:   Procedure Laterality Date   • BUNIONECTOMY Right 06/2015   • COLONOSCOPY N/A 4/3/2018    Procedure: COLONOSCOPY WITH POLYPECTOMY (HOT SNARE);  Surgeon: Naina Pandya MD;  Location: [facility] ENDOSCOPY;  Service: Gastroenterology   • DILATATION AND CURETTAGE         Review of Systems   Constitutional: Positive for diaphoresis. Negative for appetite change, chills, fatigue and fever.   HENT: Negative for congestion, ear pain, hearing loss, nosebleeds, postnasal drip, rhinorrhea, sinus pressure and trouble swallowing.    Eyes: Negative for pain, itching and visual disturbance.   Respiratory: Negative for cough, chest tightness, shortness of breath and wheezing.    Cardiovascular: Negative for chest pain, palpitations and leg swelling.   Gastrointestinal: Negative for abdominal pain, anal bleeding, constipation, diarrhea, nausea, rectal pain and vomiting.   Endocrine: Negative for cold intolerance, heat intolerance and polyuria.   Genitourinary: Negative for difficulty urinating, dysuria, flank pain, frequency, hematuria and urgency.   Musculoskeletal: Positive for arthralgias and back pain (fingers). Negative for myalgias.   Skin: Negative for rash.   Allergic/Immunologic: Positive for environmental allergies.   Neurological: Negative for dizziness, syncope, speech difficulty, weakness, light-headedness, numbness and headaches.   Hematological: Does not bruise/bleed easily.   Psychiatric/Behavioral: Negative for agitation, confusion and sleep disturbance. The patient is not nervous/anxious.        Objective   Vitals:    10/11/18 1424   BP: 112/60   Pulse: 68   Resp: 16   Temp: 98.1 °F (36.7 °C)   SpO2: 93%      1    10/11/18  1424   Weight: 57.9 kg (127 lb 9.6 oz)    [unfilled]  Body mass index is 19.98 kg/m².      Physical Exam   Constitutional: She is oriented to person, place, and time. She appears well-developed and well-nourished.   HENT:    Head: Normocephalic and atraumatic.   Right Ear: External ear normal.   Left Ear: External ear normal.   Nose: Nose normal.   Mouth/Throat: Oropharynx is clear and moist.   Eyes: Pupils are equal, round, and reactive to light. Conjunctivae and EOM are normal.   Neck: Normal range of motion. Neck supple. No JVD present. No thyromegaly present.   Cardiovascular: Normal rate, regular rhythm, normal heart sounds and intact distal pulses.  Exam reveals no gallop.    No murmur heard.  Pulmonary/Chest: Effort normal and breath sounds normal. No respiratory distress. She has no wheezes. She has no rales.   Abdominal: Soft. Bowel sounds are normal. She exhibits no distension and no mass. There is no tenderness. There is no guarding. No hernia.   Musculoskeletal: Normal range of motion. She exhibits no edema.   Lymphadenopathy:     She has no cervical adenopathy.   Neurological: She is alert and oriented to person, place, and time. She displays normal reflexes. No cranial nerve deficit. Coordination normal.   Skin: Skin is warm and dry.   Psychiatric: She has a normal mood and affect. Her behavior is normal. Judgment and thought content normal.   Nursing note and vitals reviewed.        Problem List Items Addressed This Visit        Digestive    Vitamin D deficiency       Musculoskeletal and Integument    Osteopenia      Other Visit Diagnoses     Encounter for preventive health examination    -  Primary    Relevant Orders    POCT urinalysis dipstick, automated (Completed)        Assessment/Plan   In for annual exam today.  Overall she looks great.  Annual labs 7/2018.   She exercises daily mainly with walking.  Follow-up 1 year.  Due for colonoscopy.  Recommended zoster today.  Follow-up one year with annual preventative exam.  She has occasional periods where her heart seems to beat harder faster.  Rare now.  With her mitral valve prolapse this is likely atrial arrhythmias.  She had Zostavax October 2017.  She can delay  taking Shingrix until October 2022.        ECG 12 Lead  Date/Time: 10/11/2018 4:52 PM  Performed by: MARISEL CHATTERJEE  Authorized by: MARISEL CHATTERJEE   Comparison: compared with previous ECG from 8/12/2016  Similar to previous ECG  Rhythm: sinus rhythm  Rate: normal  Conduction: conduction normal  ST Segments: ST segments normal  T Waves: T waves normal  QRS axis: normal  Other: no other findings  Clinical impression: normal ECG  Comments: Normal sinus rhythm.  Heart rate is 64.  This is a normal EKG.  There is no change from her EKG dated 8/12/2016 other than resolution of PACs                     Dragon disclaimer:   Much of this encounter note is an electronic transcription/translation of spoken language to printed text. The electronic translation of spoken language may permit erroneous, or at times, nonsensical words or phrases to be inadvertently transcribed; Although I have reviewed the note for such errors, some may still exist.

## 2019-04-30 ENCOUNTER — APPOINTMENT (OUTPATIENT)
Dept: WOMENS IMAGING | Facility: HOSPITAL | Age: 64
End: 2019-04-30

## 2019-04-30 PROCEDURE — 77067 SCR MAMMO BI INCL CAD: CPT | Performed by: RADIOLOGY

## 2019-04-30 PROCEDURE — 77063 BREAST TOMOSYNTHESIS BI: CPT | Performed by: RADIOLOGY

## 2019-09-09 ENCOUNTER — OFFICE VISIT (OUTPATIENT)
Dept: INTERNAL MEDICINE | Facility: CLINIC | Age: 64
End: 2019-09-09

## 2019-09-09 VITALS
WEIGHT: 132 LBS | HEIGHT: 67 IN | RESPIRATION RATE: 16 BRPM | DIASTOLIC BLOOD PRESSURE: 58 MMHG | TEMPERATURE: 98.7 F | SYSTOLIC BLOOD PRESSURE: 108 MMHG | HEART RATE: 88 BPM | BODY MASS INDEX: 20.72 KG/M2 | OXYGEN SATURATION: 98 %

## 2019-09-09 DIAGNOSIS — M23.301 LATERAL MENISCUS DERANGEMENT, LEFT: Primary | ICD-10-CM

## 2019-09-09 PROCEDURE — 99213 OFFICE O/P EST LOW 20 MIN: CPT | Performed by: INTERNAL MEDICINE

## 2019-09-09 RX ORDER — ESTRADIOL 1.53 MG/1
SPRAY TRANSDERMAL
Refills: 2 | COMMUNITY
Start: 2019-06-18 | End: 2023-03-14

## 2019-09-09 RX ORDER — AMOXICILLIN 500 MG/1
2000 CAPSULE ORAL ONCE AS NEEDED
Refills: 0 | COMMUNITY
Start: 2019-08-28 | End: 2021-10-18

## 2019-09-09 NOTE — PROGRESS NOTES
Subjective   Sonia Basilio is a 64 y.o. female.     Chief Complaint   Patient presents with   • Knee Pain     left knee         Knee Pain    The pain is present in the left knee. The pain is moderate. The pain has been constant since onset. Pertinent negatives include no inability to bear weight or loss of motion. She has tried ice, heat and NSAIDs for the symptoms. The treatment provided moderate relief.        The following portions of the patient's history were reviewed and updated as appropriate: allergies, current medications, past social history and problem list.    Outpatient Medications Marked as Taking for the 9/9/19 encounter (Office Visit) with Edward Maloney MD   Medication Sig Dispense Refill   • amoxicillin (AMOXIL) 500 MG capsule Take 2,000 mg by mouth 1 (One) Time As Needed. One hour before dental appointments  0   • EVAMIST 1.53 MG/SPRAY transdermal spray USE 1 TO 2 SPRAYS IN LOWER INNER ARM DAILY  2   • Multiple Vitamins-Minerals (MULTIVITAMIN & MINERAL PO) Take 1 tablet by mouth Daily.     • progesterone (PROMETRIUM) 200 MG capsule Take 200 mg by mouth daily.     • [DISCONTINUED] Estradiol (ESTROGEL) 0.75 MG/1.25 GM (0.06%) topical gel Place 1.25 g on the skin daily.         Review of Systems   Constitutional: Negative for chills and fever.   Musculoskeletal: Positive for arthralgias, gait problem and joint swelling. Negative for back pain.       Objective   Vitals:    09/09/19 1504   BP: 108/58   Pulse: 88   Resp: 16   Temp: 98.7 °F (37.1 °C)   SpO2: 98%      Wt Readings from Last 3 Encounters:   09/09/19 59.9 kg (132 lb)   10/11/18 57.9 kg (127 lb 9.6 oz)   07/16/18 56.2 kg (124 lb)    Body mass index is 20.67 kg/m².      Physical Exam   Constitutional: She appears well-developed and well-nourished.   Musculoskeletal: Normal range of motion. She exhibits no edema, tenderness or deformity.         Problem List Items Addressed This Visit     None      Visit Diagnoses     Lateral meniscus  derangement, left    -  Primary        Assessment/Plan   In with left knee pain for 10 days.  She thinks she injured it getting into a ball to her it on an old [employer] plane from [employer] War [employer].  She also walked along the [place] and had to crawl along a fine narrow plank for fear of falling through.  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  She had some swelling in the knee initially which seems to be down.  Pain along the left lateral knee.  Limping some with ambulation.  Examination shows mild tenderness with the extreme of flexion.  Otherwise exam is normal.  This is suspicious for a mild lateral meniscal tear in the left knee.  For now we will treat with ibuprofen and decreased activity.             Dragon disclaimer:   Much of this encounter note is an electronic transcription/translation of spoken language to printed text. The electronic translation of spoken language may permit erroneous, or at times, nonsensical words or phrases to be inadvertently transcribed; Although I have reviewed the note for such errors, some may still exist.

## 2019-10-14 ENCOUNTER — OFFICE VISIT (OUTPATIENT)
Dept: INTERNAL MEDICINE | Facility: CLINIC | Age: 64
End: 2019-10-14

## 2019-10-14 VITALS
BODY MASS INDEX: 20.81 KG/M2 | HEIGHT: 67 IN | OXYGEN SATURATION: 94 % | DIASTOLIC BLOOD PRESSURE: 60 MMHG | WEIGHT: 132.6 LBS | HEART RATE: 75 BPM | RESPIRATION RATE: 16 BRPM | TEMPERATURE: 98.2 F | SYSTOLIC BLOOD PRESSURE: 112 MMHG

## 2019-10-14 DIAGNOSIS — Z00.00 ENCOUNTER FOR PREVENTIVE HEALTH EXAMINATION: Primary | ICD-10-CM

## 2019-10-14 DIAGNOSIS — R07.9 CHEST PAIN, UNSPECIFIED TYPE: ICD-10-CM

## 2019-10-14 DIAGNOSIS — I34.1 MVP (MITRAL VALVE PROLAPSE): ICD-10-CM

## 2019-10-14 DIAGNOSIS — E55.9 VITAMIN D DEFICIENCY: ICD-10-CM

## 2019-10-14 DIAGNOSIS — M85.80 OSTEOPENIA, UNSPECIFIED LOCATION: ICD-10-CM

## 2019-10-14 PROCEDURE — 99396 PREV VISIT EST AGE 40-64: CPT | Performed by: INTERNAL MEDICINE

## 2019-10-14 PROCEDURE — 93000 ELECTROCARDIOGRAM COMPLETE: CPT | Performed by: INTERNAL MEDICINE

## 2019-10-14 NOTE — PROGRESS NOTES
Subjective   Sonia Basilio is a 64 y.o. female.     Chief Complaint   Patient presents with   • Annual Exam         In for annual preventative exam.  Overall she feels pretty well.  Sleeps about 7 hours per night.  Somewhat interrupted and she is usually up twice.   She exercises daily either walking or bicycling.  Energy is good.  Diet is well-balanced.         The following portions of the patient's history were reviewed and updated as appropriate: allergies, current medications, past social history and problem list.    HISTORY  Outpatient Medications Marked as Taking for the 10/14/19 encounter (Office Visit) with Edward Maloney MD   Medication Sig Dispense Refill   • amoxicillin (AMOXIL) 500 MG capsule Take 2,000 mg by mouth 1 (One) Time As Needed. One hour before dental appointments  0   • Cholecalciferol (VITAMIN D3) 5000 units capsule capsule Take 5,000 Units by mouth Daily.     • EVAMIST 1.53 MG/SPRAY transdermal spray USE 1 TO 2 SPRAYS IN LOWER INNER ARM DAILY  2   • Multiple Vitamins-Minerals (MULTIVITAMIN & MINERAL PO) Take 1 tablet by mouth Daily.     • progesterone (PROMETRIUM) 200 MG capsule Take 200 mg by mouth daily.       Social History     Socioeconomic History   • Marital status:      Spouse name: Not on file   • Number of children: Not on file   • Years of education: Not on file   • Highest education level: Not on file   Tobacco Use   • Smoking status: Never Smoker   • Smokeless tobacco: Never Used   Substance and Sexual Activity   • Alcohol use: Yes     Frequency: Monthly or less     Drinks per session: 1 or 2     Binge frequency: Never   • Drug use: No     Family History   Problem Relation Age of Onset   • Stroke Mother    • Dementia Mother    • Atrial fibrillation Sister    • Hyperlipidemia Sister    • Heart disease Sister    • Alcohol abuse Son    • Colon cancer Paternal Grandmother      Past Medical History:   Diagnosis Date   • Allergic rhinitis    • H/O degenerative disc  disease    • Medication management    • MVP (mitral valve prolapse)    • Osteopenia    • Physical exam, annual    • Sciatica    • Vitamin D deficiency      Past Surgical History:   Procedure Laterality Date   • BUNIONECTOMY Right 06/2015   • COLONOSCOPY N/A 4/3/2018    Procedure: COLONOSCOPY WITH POLYPECTOMY (HOT SNARE);  Surgeon: Naina Pandya MD;  [facility]: [facility] ENDOSCOPY;  Service: Gastroenterology   • DILATATION AND CURETTAGE         Review of Systems   Constitutional: Positive for diaphoresis. Negative for appetite change, chills, fatigue and fever.   HENT: Negative for congestion, ear pain, hearing loss, nosebleeds, postnasal drip, rhinorrhea, sinus pressure and trouble swallowing.    Eyes: Negative for pain, itching and visual disturbance.   Respiratory: Negative for cough, chest tightness, shortness of breath and wheezing.    Cardiovascular: Positive for chest pain. Negative for palpitations and leg swelling.   Gastrointestinal: Negative for abdominal pain, anal bleeding, constipation, diarrhea, nausea, rectal pain and vomiting.   Endocrine: Negative for cold intolerance, heat intolerance and polyuria.   Genitourinary: Negative for difficulty urinating, dysuria, flank pain, frequency, hematuria and urgency.   Musculoskeletal: Positive for arthralgias ( hands). Negative for back pain (fingers) and myalgias.   Skin: Negative for rash.   Allergic/Immunologic: Positive for environmental allergies.   Neurological: Positive for headaches. Negative for dizziness, syncope, speech difficulty, weakness, light-headedness and numbness.   Hematological: Does not bruise/bleed easily.   Psychiatric/Behavioral: Negative for agitation, confusion and sleep disturbance. The patient is not nervous/anxious.        Objective   Vitals:    10/14/19 1424   BP: 112/60   Pulse: 75   Resp: 16   Temp: 98.2 °F (36.8 °C)   SpO2: 94%          10/14/19  1424   Weight: 60.1 kg (132 lb 9.6 oz)    [unfilled]  Body mass index is 20.76  kg/m².      Physical Exam   Constitutional: She is oriented to person, place, and time. She appears well-developed and well-nourished.   HENT:   Head: Normocephalic and atraumatic.   Right Ear: External ear normal.   Left Ear: External ear normal.   Nose: Nose normal.   Mouth/Throat: Oropharynx is clear and moist.   Eyes: Conjunctivae and EOM are normal. Pupils are equal, round, and reactive to light.   Neck: Normal range of motion. Neck supple. No JVD present. No thyromegaly present.   Cardiovascular: Normal rate, regular rhythm, normal heart sounds and intact distal pulses. Exam reveals no gallop.   No murmur heard.  Pulmonary/Chest: Effort normal and breath sounds normal. No respiratory distress. She has no wheezes. She has no rales.   Abdominal: Soft. Bowel sounds are normal. She exhibits no distension and no mass. There is no tenderness. There is no guarding. No hernia.   Musculoskeletal: Normal range of motion. She exhibits no edema.   Lymphadenopathy:     She has no cervical adenopathy.   Neurological: She is alert and oriented to person, place, and time. She displays normal reflexes. No cranial nerve deficit. Coordination normal.   Skin: Skin is warm and dry.   Psychiatric: She has a normal mood and affect. Her behavior is normal. Judgment and thought content normal.   Nursing note and vitals reviewed.        Problem List Items Addressed This Visit        Cardiovascular and Mediastinum    MVP (mitral valve prolapse)       Digestive    Vitamin D deficiency       Musculoskeletal and Integument    Osteopenia      Other Visit Diagnoses     Encounter for preventive health examination    -  Primary        Assessment/Plan   In for annual exam today.  Overall she looks great.  Annual labs 7/2018.   She exercises daily mainly with walking.  Follow-up 1 year.  Follow-up one year with annual preventative exam.  For the past 3 months she has noticed some exercise-induced chest pressure radiating into the left arm.  No  associated shortness of breath.  It worries her because her father  from a heart attack.  Her risk factors are remarkably low.  We will check lab work first make sure she is not anemic.  If labs are okay we will set her up for a stress Cardiolite.  She had Zostavax 2017.  She can delay taking Shingrix until 2022.        ECG 12 Lead  Date/Time: 10/14/2019 5:01 PM  Performed by: Edward Maloney MD  Authorized by: Edward Maloney MD   Comparison: compared with previous ECG from 10/11/2018  Similar to previous ECG  Rhythm: sinus rhythm  Rate: normal  Conduction: conduction normal  ST Segments: ST segments normal  T Waves: T waves normal  QRS axis: normal  Other: no other findings    Clinical impression: normal ECG  Comments: Normal sinus rhythm.  Heart rate is 68.  This is a normal EKG.  There is no change from the prior EKG dated 10/11/2018.                       Dragon disclaimer:   Much of this encounter note is an electronic transcription/translation of spoken language to printed text. The electronic translation of spoken language may permit erroneous, or at times, nonsensical words or phrases to be inadvertently transcribed; Although I have reviewed the note for such errors, some may still exist.

## 2019-10-15 LAB
ALBUMIN SERPL-MCNC: 4.6 G/DL (ref 3.5–5.2)
ALBUMIN/GLOB SERPL: 1.8 G/DL
ALP SERPL-CCNC: 64 U/L (ref 39–117)
ALT SERPL-CCNC: 13 U/L (ref 1–33)
AST SERPL-CCNC: 18 U/L (ref 1–32)
BASOPHILS # BLD AUTO: 0.05 10*3/MM3 (ref 0–0.2)
BASOPHILS NFR BLD AUTO: 0.6 % (ref 0–1.5)
BILIRUB SERPL-MCNC: 0.2 MG/DL (ref 0.2–1.2)
BUN SERPL-MCNC: 19 MG/DL (ref 8–23)
BUN/CREAT SERPL: 23.5 (ref 7–25)
CALCIUM SERPL-MCNC: 9.6 MG/DL (ref 8.6–10.5)
CHLORIDE SERPL-SCNC: 101 MMOL/L (ref 98–107)
CO2 SERPL-SCNC: 28 MMOL/L (ref 22–29)
CREAT SERPL-MCNC: 0.81 MG/DL (ref 0.57–1)
EOSINOPHIL # BLD AUTO: 0.21 10*3/MM3 (ref 0–0.4)
EOSINOPHIL NFR BLD AUTO: 2.7 % (ref 0.3–6.2)
ERYTHROCYTE [DISTWIDTH] IN BLOOD BY AUTOMATED COUNT: 11.8 % (ref 12.3–15.4)
GLOBULIN SER CALC-MCNC: 2.5 GM/DL
GLUCOSE SERPL-MCNC: 90 MG/DL (ref 65–99)
HCT VFR BLD AUTO: 39.9 % (ref 34–46.6)
HGB BLD-MCNC: 13.5 G/DL (ref 12–15.9)
IMM GRANULOCYTES # BLD AUTO: 0.03 10*3/MM3 (ref 0–0.05)
IMM GRANULOCYTES NFR BLD AUTO: 0.4 % (ref 0–0.5)
LYMPHOCYTES # BLD AUTO: 2.05 10*3/MM3 (ref 0.7–3.1)
LYMPHOCYTES NFR BLD AUTO: 26.2 % (ref 19.6–45.3)
MCH RBC QN AUTO: 31.1 PG (ref 26.6–33)
MCHC RBC AUTO-ENTMCNC: 33.8 G/DL (ref 31.5–35.7)
MCV RBC AUTO: 91.9 FL (ref 79–97)
MONOCYTES # BLD AUTO: 0.6 10*3/MM3 (ref 0.1–0.9)
MONOCYTES NFR BLD AUTO: 7.7 % (ref 5–12)
NEUTROPHILS # BLD AUTO: 4.89 10*3/MM3 (ref 1.7–7)
NEUTROPHILS NFR BLD AUTO: 62.4 % (ref 42.7–76)
NRBC BLD AUTO-RTO: 0 /100 WBC (ref 0–0.2)
PLATELET # BLD AUTO: 240 10*3/MM3 (ref 140–450)
POTASSIUM SERPL-SCNC: 4.2 MMOL/L (ref 3.5–5.2)
PROT SERPL-MCNC: 7.1 G/DL (ref 6–8.5)
RBC # BLD AUTO: 4.34 10*6/MM3 (ref 3.77–5.28)
SODIUM SERPL-SCNC: 142 MMOL/L (ref 136–145)
T4 FREE SERPL-MCNC: 1.16 NG/DL (ref 0.93–1.7)
TSH SERPL DL<=0.005 MIU/L-ACNC: 1.66 UIU/ML (ref 0.27–4.2)
WBC # BLD AUTO: 7.83 10*3/MM3 (ref 3.4–10.8)

## 2019-11-11 DIAGNOSIS — R07.9 CHEST PAIN, UNSPECIFIED TYPE: Primary | ICD-10-CM

## 2019-11-27 ENCOUNTER — HOSPITAL ENCOUNTER (OUTPATIENT)
Dept: NUCLEAR MEDICINE | Facility: HOSPITAL | Age: 64
Discharge: HOME | End: 2019-11-27

## 2019-11-27 LAB
BH CV STRESS BP STAGE 1: NORMAL
BH CV STRESS BP STAGE 2: NORMAL
BH CV STRESS BP STAGE 3: NORMAL
BH CV STRESS DURATION MIN STAGE 1: 3
BH CV STRESS DURATION MIN STAGE 2: 3
BH CV STRESS DURATION MIN STAGE 3: 0
BH CV STRESS DURATION SEC STAGE 1: 0
BH CV STRESS DURATION SEC STAGE 2: 0
BH CV STRESS DURATION SEC STAGE 3: 52
BH CV STRESS GRADE STAGE 1: 10
BH CV STRESS GRADE STAGE 2: 12
BH CV STRESS GRADE STAGE 3: 14
BH CV STRESS HR STAGE 1: 124
BH CV STRESS HR STAGE 2: 138
BH CV STRESS HR STAGE 3: 148
BH CV STRESS METS STAGE 1: 5
BH CV STRESS METS STAGE 2: 7.5
BH CV STRESS METS STAGE 3: 10
BH CV STRESS PROTOCOL 1: NORMAL
BH CV STRESS RECOVERY BP: NORMAL MMHG
BH CV STRESS RECOVERY HR: 83 BPM
BH CV STRESS SPEED STAGE 1: 1.7
BH CV STRESS SPEED STAGE 2: 2.5
BH CV STRESS SPEED STAGE 3: 3.4
BH CV STRESS STAGE 1: 1
BH CV STRESS STAGE 2: 2
BH CV STRESS STAGE 3: 3
LV EF NUC BP: 70 %
MAXIMAL PREDICTED HEART RATE: 158 BPM
PERCENT MAX PREDICTED HR: 93.67 %
STRESS BASELINE BP: NORMAL MMHG
STRESS BASELINE HR: 64 BPM
STRESS PERCENT HR: 110 %
STRESS POST ESTIMATED WORKLOAD: 8.1 METS
STRESS POST EXERCISE DUR MIN: 6 MIN
STRESS POST EXERCISE DUR SEC: 52 SEC
STRESS POST PEAK BP: NORMAL MMHG
STRESS POST PEAK HR: 148 BPM
STRESS TARGET HR: 134 BPM

## 2019-11-27 PROCEDURE — 93017 CV STRESS TEST TRACING ONLY: CPT

## 2019-11-27 PROCEDURE — 78452 HT MUSCLE IMAGE SPECT MULT: CPT

## 2019-11-27 PROCEDURE — 78452 HT MUSCLE IMAGE SPECT MULT: CPT | Performed by: INTERNAL MEDICINE

## 2019-11-27 PROCEDURE — 0 TECHNETIUM SESTAMIBI: Performed by: INTERNAL MEDICINE

## 2019-11-27 PROCEDURE — 93016 CV STRESS TEST SUPVJ ONLY: CPT | Performed by: INTERNAL MEDICINE

## 2019-11-27 PROCEDURE — A9500 TC99M SESTAMIBI: HCPCS | Performed by: INTERNAL MEDICINE

## 2019-11-27 PROCEDURE — 93018 CV STRESS TEST I&R ONLY: CPT | Performed by: INTERNAL MEDICINE

## 2019-11-27 RX ADMIN — TECHNETIUM TC 99M SESTAMIBI 1 DOSE: 1 INJECTION INTRAVENOUS at 07:25

## 2019-11-27 RX ADMIN — TECHNETIUM TC 99M SESTAMIBI 1 DOSE: 1 INJECTION INTRAVENOUS at 09:50

## 2020-05-05 ENCOUNTER — APPOINTMENT (OUTPATIENT)
Dept: WOMENS IMAGING | Facility: HOSPITAL | Age: 65
End: 2020-05-05

## 2020-05-05 PROCEDURE — 77067 SCR MAMMO BI INCL CAD: CPT | Performed by: RADIOLOGY

## 2020-05-05 PROCEDURE — 77063 BREAST TOMOSYNTHESIS BI: CPT | Performed by: RADIOLOGY

## 2020-10-15 ENCOUNTER — OFFICE VISIT (OUTPATIENT)
Dept: INTERNAL MEDICINE | Facility: CLINIC | Age: 65
End: 2020-10-15

## 2020-10-15 VITALS
RESPIRATION RATE: 16 BRPM | BODY MASS INDEX: 21.03 KG/M2 | OXYGEN SATURATION: 98 % | HEIGHT: 67 IN | SYSTOLIC BLOOD PRESSURE: 98 MMHG | HEART RATE: 78 BPM | TEMPERATURE: 96.8 F | DIASTOLIC BLOOD PRESSURE: 60 MMHG | WEIGHT: 134 LBS

## 2020-10-15 DIAGNOSIS — Z00.00 ENCOUNTER FOR PREVENTIVE HEALTH EXAMINATION: Primary | ICD-10-CM

## 2020-10-15 DIAGNOSIS — I34.1 MVP (MITRAL VALVE PROLAPSE): ICD-10-CM

## 2020-10-15 DIAGNOSIS — E55.9 VITAMIN D DEFICIENCY: ICD-10-CM

## 2020-10-15 PROBLEM — C43.62 MALIGNANT MELANOMA OF LEFT UPPER EXTREMITY INCLUDING SHOULDER (HCC): Status: ACTIVE | Noted: 2020-10-15

## 2020-10-15 PROCEDURE — 99396 PREV VISIT EST AGE 40-64: CPT | Performed by: INTERNAL MEDICINE

## 2020-10-15 RX ORDER — VIT C/B6/B5/MAGNESIUM/HERB 173 50-5-6-5MG
1 CAPSULE ORAL DAILY
COMMUNITY
End: 2022-10-20

## 2020-10-15 NOTE — PROGRESS NOTES
Subjective   Sonia Basilio is a 65 y.o. female.     Chief Complaint   Patient presents with   • Annual Exam         In for annual preventative exam.  Overall she feels pretty well.  Sleeps about 7 hours per night.  Somewhat interrupted and she is usually up twice.   She exercises 5 day weekly either walking or bicycling.  Energy is could be better.  Diet is well-balanced.       The following portions of the patient's history were reviewed and updated as appropriate: allergies, current medications, past social history and problem list.    HISTORY  Outpatient Medications Marked as Taking for the 10/15/20 encounter (Office Visit) with Edward Maloney MD   Medication Sig Dispense Refill   • Cholecalciferol (VITAMIN D3) 5000 units capsule capsule Take 5,000 Units by mouth Daily.     • EVAMIST 1.53 MG/SPRAY transdermal spray USE 1 TO 2 SPRAYS IN LOWER INNER ARM DAILY  2   • Multiple Vitamins-Minerals (MULTIVITAMIN & MINERAL PO) Take 1 tablet by mouth Daily.     • progesterone (PROMETRIUM) 200 MG capsule Take 200 mg by mouth daily.     • Turmeric 500 MG capsule Take 1 tablet by mouth Daily.       Social History     Socioeconomic History   • Marital status:      Spouse name: Not on file   • Number of children: Not on file   • Years of education: Not on file   • Highest education level: Not on file   Tobacco Use   • Smoking status: Never Smoker   • Smokeless tobacco: Never Used   Substance and Sexual Activity   • Alcohol use: Yes     Frequency: Monthly or less     Drinks per session: 1 or 2     Binge frequency: Never   • Drug use: No     Family History   Problem Relation Age of Onset   • Stroke Mother    • Dementia Mother    • Atrial fibrillation Sister    • Hyperlipidemia Sister    • Heart disease Sister    • Alcohol abuse Son    • Colon cancer Paternal Grandmother      Past Medical History:   Diagnosis Date   • Allergic rhinitis    • H/O degenerative disc disease    • Medication management    • MVP (mitral  valve prolapse)    • Osteopenia    • Physical exam, annual    • Sciatica    • Vitamin D deficiency      Past Surgical History:   Procedure Laterality Date   • BUNIONECTOMY Right 06/2015   • COLONOSCOPY N/A 4/3/2018    Procedure: COLONOSCOPY WITH POLYPECTOMY (HOT SNARE);  Surgeon: Naina Pandya MD;  Location: [facility] ENDOSCOPY;  Service: Gastroenterology   • DILATATION AND CURETTAGE         Review of Systems   Constitutional: Negative for appetite change, chills, diaphoresis, fatigue, fever and unexpected weight change.   HENT: Negative for congestion, ear pain, hearing loss, nosebleeds, postnasal drip, rhinorrhea, sinus pressure and trouble swallowing.    Eyes: Negative for pain, itching and visual disturbance.   Respiratory: Negative for cough, chest tightness, shortness of breath and wheezing.    Cardiovascular: Negative for chest pain, palpitations and leg swelling.   Gastrointestinal: Negative for abdominal pain, anal bleeding, constipation, diarrhea, nausea, rectal pain and vomiting.   Endocrine: Negative for cold intolerance, heat intolerance and polyuria.   Genitourinary: Negative for difficulty urinating, dysuria, flank pain, frequency, hematuria and urgency.   Musculoskeletal: Positive for arthralgias ( hands). Negative for back pain (fingers) and myalgias.   Skin: Negative for rash.   Allergic/Immunologic: Positive for environmental allergies.   Neurological: Positive for headaches. Negative for dizziness, syncope, speech difficulty, weakness, light-headedness and numbness.   Hematological: Does not bruise/bleed easily.   Psychiatric/Behavioral: Positive for dysphoric mood. Negative for agitation, confusion and sleep disturbance. The patient is not nervous/anxious.        Objective   Vitals:    10/15/20 1439   BP: 98/60   Pulse: 78   Resp: 16   Temp: 96.8 °F (36 °C)   SpO2: 98%          10/15/20  1439   Weight: 60.8 kg (134 lb)    [unfilled]  Body mass index is 20.98 kg/m².      Physical Exam    Constitutional: She is oriented to person, place, and time. She appears well-developed.   HENT:   Head: Normocephalic and atraumatic.   Right Ear: External ear normal.   Left Ear: External ear normal.   Nose: Nose normal.   Eyes: Pupils are equal, round, and reactive to light. Conjunctivae are normal.   Neck: Normal range of motion. Neck supple. No JVD present. No thyromegaly present.   Cardiovascular: Normal rate, regular rhythm and normal heart sounds. Exam reveals no gallop.   No murmur heard.  Pulmonary/Chest: Effort normal and breath sounds normal. No respiratory distress. She has no wheezes. She has no rales.   Abdominal: Soft. Bowel sounds are normal. She exhibits no distension and no mass. There is no abdominal tenderness. There is no guarding. No hernia.   Musculoskeletal: Normal range of motion.   Lymphadenopathy:     She has no cervical adenopathy.   Neurological: She is alert and oriented to person, place, and time. She displays normal reflexes. No cranial nerve deficit. Coordination normal.   Skin: Skin is warm and dry.   Psychiatric: Her behavior is normal. Judgment and thought content normal.   Nursing note and vitals reviewed.        Problems Addressed this Visit        Cardiovascular and Mediastinum    MVP (mitral valve prolapse)       Digestive    Vitamin D deficiency      Other Visit Diagnoses     Encounter for preventive health examination    -  Primary      Diagnoses       Codes Comments    Encounter for preventive health examination    -  Primary ICD-10-CM: Z00.00  ICD-9-CM: V70.0     Vitamin D deficiency     ICD-10-CM: E55.9  ICD-9-CM: 268.9     MVP (mitral valve prolapse)     ICD-10-CM: I34.1  ICD-9-CM: 424.0         Assessment/Plan   In for annual exam today.  Overall she looks great.  Annual labs today will include CBC, CMP, lipids, UA.   She exercises regularly mainly with walking.  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  Follow-up 1 year.  Follow-up  one year with annual preventative exam.  She had Zostavax October 2017.  She can delay taking Shingrix until October 2022.  4.5 HPP today.    Prevention counseling was performed today. The counseling performed was routine health maintenance topics.    PPE today included face mask and eye shield.         Dragon disclaimer:   Much of this encounter note is an electronic transcription/translation of spoken language to printed text. The electronic translation of spoken language may permit erroneous, or at times, nonsensical words or phrases to be inadvertently transcribed; Although I have reviewed the note for such errors, some may still exist.

## 2020-10-16 LAB
ALBUMIN SERPL-MCNC: 4.6 G/DL (ref 3.5–5.2)
ALBUMIN/GLOB SERPL: 2.3 G/DL
ALP SERPL-CCNC: 65 U/L (ref 39–117)
ALT SERPL-CCNC: 15 U/L (ref 1–33)
AST SERPL-CCNC: 19 U/L (ref 1–32)
BASOPHILS # BLD AUTO: 0.06 10*3/MM3 (ref 0–0.2)
BASOPHILS NFR BLD AUTO: 0.8 % (ref 0–1.5)
BILIRUB SERPL-MCNC: 0.2 MG/DL (ref 0–1.2)
BUN SERPL-MCNC: 16 MG/DL (ref 8–23)
BUN/CREAT SERPL: 19 (ref 7–25)
CALCIUM SERPL-MCNC: 9.4 MG/DL (ref 8.6–10.5)
CHLORIDE SERPL-SCNC: 105 MMOL/L (ref 98–107)
CHOLEST SERPL-MCNC: 221 MG/DL (ref 0–200)
CHOLEST/HDLC SERPL: 2.63 {RATIO}
CO2 SERPL-SCNC: 27.6 MMOL/L (ref 22–29)
CREAT SERPL-MCNC: 0.84 MG/DL (ref 0.57–1)
EOSINOPHIL # BLD AUTO: 0.18 10*3/MM3 (ref 0–0.4)
EOSINOPHIL NFR BLD AUTO: 2.3 % (ref 0.3–6.2)
ERYTHROCYTE [DISTWIDTH] IN BLOOD BY AUTOMATED COUNT: 12.1 % (ref 12.3–15.4)
GLOBULIN SER CALC-MCNC: 2 GM/DL
GLUCOSE SERPL-MCNC: 85 MG/DL (ref 65–99)
HCT VFR BLD AUTO: 39 % (ref 34–46.6)
HDLC SERPL-MCNC: 84 MG/DL (ref 40–60)
HGB BLD-MCNC: 13.1 G/DL (ref 12–15.9)
IMM GRANULOCYTES # BLD AUTO: 0.03 10*3/MM3 (ref 0–0.05)
IMM GRANULOCYTES NFR BLD AUTO: 0.4 % (ref 0–0.5)
LDLC SERPL CALC-MCNC: 121 MG/DL (ref 0–100)
LYMPHOCYTES # BLD AUTO: 2.54 10*3/MM3 (ref 0.7–3.1)
LYMPHOCYTES NFR BLD AUTO: 31.8 % (ref 19.6–45.3)
MCH RBC QN AUTO: 30.1 PG (ref 26.6–33)
MCHC RBC AUTO-ENTMCNC: 33.6 G/DL (ref 31.5–35.7)
MCV RBC AUTO: 89.7 FL (ref 79–97)
MONOCYTES # BLD AUTO: 0.68 10*3/MM3 (ref 0.1–0.9)
MONOCYTES NFR BLD AUTO: 8.5 % (ref 5–12)
NEUTROPHILS # BLD AUTO: 4.49 10*3/MM3 (ref 1.7–7)
NEUTROPHILS NFR BLD AUTO: 56.2 % (ref 42.7–76)
NRBC BLD AUTO-RTO: 0 /100 WBC (ref 0–0.2)
PLATELET # BLD AUTO: 257 10*3/MM3 (ref 140–450)
POTASSIUM SERPL-SCNC: 4.2 MMOL/L (ref 3.5–5.2)
PROT SERPL-MCNC: 6.6 G/DL (ref 6–8.5)
RBC # BLD AUTO: 4.35 10*6/MM3 (ref 3.77–5.28)
SODIUM SERPL-SCNC: 141 MMOL/L (ref 136–145)
TRIGL SERPL-MCNC: 94 MG/DL (ref 0–150)
VLDLC SERPL CALC-MCNC: 16 MG/DL (ref 5–40)
WBC # BLD AUTO: 7.98 10*3/MM3 (ref 3.4–10.8)

## 2020-11-16 ENCOUNTER — TELEPHONE (OUTPATIENT)
Dept: INTERNAL MEDICINE | Facility: CLINIC | Age: 65
End: 2020-11-16

## 2020-11-16 NOTE — TELEPHONE ENCOUNTER
Spoke w/ patient. She has already been advised by urgent care and health department to self quarantine until 11/23/20. She knows to watch for any sob (O2 levels dipping below 92%) and any developing fever. Patient has a bit of a stuffy nose and some drainage, otherwise no symptoms. Will follow up with patient with a Covid home monitoring call on 11/18.

## 2020-11-16 NOTE — TELEPHONE ENCOUNTER
Pt called and said she just spoke to the health department and they told her she needs to inform us that she did test positive for covid. Test was adninistered at [facility] on 11/12/20.

## 2020-11-18 ENCOUNTER — OFFICE VISIT (OUTPATIENT)
Dept: INTERNAL MEDICINE | Facility: CLINIC | Age: 65
End: 2020-11-18

## 2020-11-18 DIAGNOSIS — Z20.822 EXPOSURE TO COVID-19 VIRUS: Primary | ICD-10-CM

## 2020-11-18 NOTE — PROGRESS NOTES
Outgoing call made to patient to complete Covid home monitoring follow up. LM for patient to call back.

## 2020-11-20 ENCOUNTER — OFFICE VISIT (OUTPATIENT)
Dept: INTERNAL MEDICINE | Facility: CLINIC | Age: 65
End: 2020-11-20

## 2020-11-20 DIAGNOSIS — U07.1 COVID-19: Primary | ICD-10-CM

## 2020-11-20 NOTE — PROGRESS NOTES
Spoke w/ patient. She is feeling good. Still slightly fatigued. Some sinus drainage and slight cough. No fever or shortness of breath. Overall patient is feeling fine. She was advised to quarantine until 11/23/20 by health department, but patient is giving it an extra day. We will follow up with one more home monitoring call on 11/24/20.

## 2021-01-18 ENCOUNTER — TELEPHONE (OUTPATIENT)
Dept: INTERNAL MEDICINE | Facility: CLINIC | Age: 66
End: 2021-01-18

## 2021-01-18 NOTE — TELEPHONE ENCOUNTER
There is no right or wrong.  I do not know the if there is a downside.  Many people would wait 3 months after having had the Covid given that they are almost surely immune and therefore giving others an opportunity to move up in line to get their shots.  The immunity following a round of Covid is likely to be 6 to 12 months or longer.  Some people simply say that it is easier just to have everybody take the Covid shots then to worry about it, especially since there is limited data.

## 2021-01-18 NOTE — TELEPHONE ENCOUNTER
Patient is scheduled this afternoon for her first Covid shot. She was Covid positive mid November. Should she wait longer to get the vaccine, or is she ok to take it today? Patient has heard mixed information about this. Please advise.

## 2021-05-06 ENCOUNTER — APPOINTMENT (OUTPATIENT)
Dept: WOMENS IMAGING | Facility: HOSPITAL | Age: 66
End: 2021-05-06

## 2021-05-06 PROCEDURE — 77063 BREAST TOMOSYNTHESIS BI: CPT | Performed by: RADIOLOGY

## 2021-05-06 PROCEDURE — 77067 SCR MAMMO BI INCL CAD: CPT | Performed by: RADIOLOGY

## 2021-08-16 ENCOUNTER — OFFICE VISIT (OUTPATIENT)
Dept: INTERNAL MEDICINE | Facility: CLINIC | Age: 66
End: 2021-08-16

## 2021-08-16 VITALS
OXYGEN SATURATION: 98 % | RESPIRATION RATE: 16 BRPM | HEIGHT: 67 IN | DIASTOLIC BLOOD PRESSURE: 64 MMHG | HEART RATE: 78 BPM | WEIGHT: 139 LBS | TEMPERATURE: 96.6 F | SYSTOLIC BLOOD PRESSURE: 92 MMHG | BODY MASS INDEX: 21.82 KG/M2

## 2021-08-16 DIAGNOSIS — M54.2 NECK PAIN: Primary | ICD-10-CM

## 2021-08-16 PROCEDURE — 99213 OFFICE O/P EST LOW 20 MIN: CPT | Performed by: INTERNAL MEDICINE

## 2021-08-16 NOTE — PROGRESS NOTES
Subjective   Sonia Basilio is a 66 y.o. female.     Chief Complaint   Patient presents with   • Neck Pain   • Shoulder Pain         Neck Pain   This is a new problem. The current episode started in the past 7 days. The problem occurs constantly. The problem has been unchanged. The pain is associated with nothing. The pain is present in the occipital region. The quality of the pain is described as aching. The pain is moderate. The symptoms are aggravated by twisting. Pertinent negatives include no fever. She has tried NSAIDs for the symptoms. The treatment provided moderate relief.        The following portions of the patient's history were reviewed and updated as appropriate: allergies, current medications, past social history and problem list.    Outpatient Medications Marked as Taking for the 8/16/21 encounter (Office Visit) with Edward Maloney MD   Medication Sig Dispense Refill   • amoxicillin (AMOXIL) 500 MG capsule Take 2,000 mg by mouth 1 (One) Time As Needed. One hour before dental appointments  0   • Cholecalciferol (VITAMIN D3) 5000 units capsule capsule Take 5,000 Units by mouth Daily.     • EVAMIST 1.53 MG/SPRAY transdermal spray USE 1 TO 2 SPRAYS IN LOWER INNER ARM DAILY  2   • Multiple Vitamins-Minerals (MULTIVITAMIN & MINERAL PO) Take 1 tablet by mouth Daily.     • progesterone (PROMETRIUM) 200 MG capsule Take 200 mg by mouth daily.     • sertraline (ZOLOFT) 50 MG tablet Take 50 mg by mouth Daily.     • Turmeric 500 MG capsule Take 1 tablet by mouth Daily.         Review of Systems   Constitutional: Negative for chills and fever.   Musculoskeletal: Positive for neck pain.       Objective   Vitals:    08/16/21 1436   BP: 92/64   Pulse: 78   Resp: 16   Temp: 96.6 °F (35.9 °C)   SpO2: 98%      Wt Readings from Last 3 Encounters:   08/16/21 63 kg (139 lb)   11/12/20 59.9 kg (132 lb)   10/15/20 60.8 kg (134 lb)    Body mass index is 21.77 kg/m².      Physical Exam  Musculoskeletal:          General: No swelling, tenderness or deformity. Normal range of motion.           Problems Addressed this Visit     None      Visit Diagnoses     Neck pain    -  Primary      Diagnoses       Codes Comments    Neck pain    -  Primary ICD-10-CM: M54.2  ICD-9-CM: 723.1         Assessment/Plan   In today with a in the shoulder girdle distribution. Mainly neck and trapezius muscles. Symptoms for 1 week. No fever. Ache when she moves her head around. She is got normal range of motion. Some mild tenderness in the trapezius groups which actually feels better with palpation and massage. There is been no significant lifting or trauma. School is about to start. Things have been very stressful in the last year with Covid and remote learning. I think for now this is all muscular and likely stress related. She is now on Zoloft 50 mg a day for stress. We will increase ibuprofen use to 40 mg 4 times daily from once or twice a day. Heat. Massage as needed. She also mentions a recent tick bite that she got off in about 24 hours or less. The area is completely healed. She has had no other concerning symptoms and she is reassured about that.    The above information was reviewed again today 08/16/21.  It continues to be accurate as reflected above and is unchanged.  History, physical and review of systems all reviewed and are unchanged.  Medications were reviewed today and continue the current dosing.    PPE today includes face mask and eye shield.               Dragon disclaimer:   Much of this encounter note is an electronic transcription/translation of spoken language to printed text. The electronic translation of spoken language may permit erroneous, or at times, nonsensical words or phrases to be inadvertently transcribed; Although I have reviewed the note for such errors, some may still exist.

## 2021-10-18 ENCOUNTER — OFFICE VISIT (OUTPATIENT)
Dept: INTERNAL MEDICINE | Facility: CLINIC | Age: 66
End: 2021-10-18

## 2021-10-18 VITALS
TEMPERATURE: 97.8 F | HEART RATE: 74 BPM | WEIGHT: 138 LBS | SYSTOLIC BLOOD PRESSURE: 102 MMHG | DIASTOLIC BLOOD PRESSURE: 58 MMHG | RESPIRATION RATE: 16 BRPM | BODY MASS INDEX: 21.66 KG/M2 | HEIGHT: 67 IN | OXYGEN SATURATION: 98 %

## 2021-10-18 DIAGNOSIS — E55.9 VITAMIN D DEFICIENCY: ICD-10-CM

## 2021-10-18 DIAGNOSIS — M85.80 OSTEOPENIA, UNSPECIFIED LOCATION: ICD-10-CM

## 2021-10-18 DIAGNOSIS — Z00.00 ENCOUNTER FOR PREVENTIVE HEALTH EXAMINATION: Primary | ICD-10-CM

## 2021-10-18 PROCEDURE — 99396 PREV VISIT EST AGE 40-64: CPT | Performed by: INTERNAL MEDICINE

## 2021-10-18 NOTE — PROGRESS NOTES
Subjective   Sonia Basilio is a 66 y.o. female.     Chief Complaint   Patient presents with   • Annual Exam   • Vitamin D Deficiency   • Neck Pain     Resolving    • Fatigue   • Lt. Hip/Knee Pain   • Chest Pain     Upon Walking Fast          In for annual preventative exam.  Overall she feels pretty well.  Sleeps about 6 hours per night.  Somewhat interrupted and she is usually up twice.   She exercises 5 day weekly mainly walking.  Energy is could be better.  Periods of fatigue.  Diet is well-balanced.    Fatigue  Associated symptoms include arthralgias ( hands) and fatigue. Pertinent negatives include no chills or myalgias.        The following portions of the patient's history were reviewed and updated as appropriate: allergies, current medications, past social history and problem list.    HISTORY  Outpatient Medications Marked as Taking for the 10/18/21 encounter (Office Visit) with Edward Maloney MD   Medication Sig Dispense Refill   • Cholecalciferol (VITAMIN D3) 5000 units capsule capsule Take 5,000 Units by mouth Daily.     • EVAMIST 1.53 MG/SPRAY transdermal spray USE 1 TO 2 SPRAYS IN LOWER INNER ARM DAILY  2   • Multiple Vitamins-Minerals (MULTIVITAMIN & MINERAL PO) Take 1 tablet by mouth Daily.     • progesterone (PROMETRIUM) 200 MG capsule Take 200 mg by mouth daily.     • sertraline (ZOLOFT) 50 MG tablet Take 50 mg by mouth Daily.     • Turmeric 500 MG capsule Take 1 tablet by mouth Daily.     • [DISCONTINUED] amoxicillin (AMOXIL) 500 MG capsule Take 2,000 mg by mouth 1 (One) Time As Needed. One hour before dental appointments  0     Social History     Socioeconomic History   • Marital status:    Tobacco Use   • Smoking status: Never Smoker   • Smokeless tobacco: Never Used   Substance and Sexual Activity   • Alcohol use: Yes   • Drug use: No     Family History   Problem Relation Age of Onset   • Stroke Mother    • Dementia Mother    • Atrial fibrillation Sister    • Hyperlipidemia Sister     • Heart disease Sister    • Alcohol abuse Son    • Colon cancer Paternal Grandmother      Past Medical History:   Diagnosis Date   • Allergic rhinitis    • H/O degenerative disc disease    • Medication management    • MVP (mitral valve prolapse)    • Osteopenia    • Physical exam, annual    • Sciatica    • Vitamin D deficiency      Past Surgical History:   Procedure Laterality Date   • BUNIONECTOMY Right 06/2015   • COLONOSCOPY N/A 4/3/2018    Procedure: COLONOSCOPY WITH POLYPECTOMY (HOT SNARE);  Surgeon: Naina Pandya MD;  Location: [facility] ENDOSCOPY;  Service: Gastroenterology   • DILATATION AND CURETTAGE         Review of Systems   Constitutional: Positive for fatigue. Negative for appetite change, chills and unexpected weight change.   Respiratory: Negative for chest tightness and wheezing.    Cardiovascular: Negative for leg swelling.   Gastrointestinal: Negative for anal bleeding, blood in stool, constipation, diarrhea and rectal pain.   Genitourinary: Negative for difficulty urinating, dysuria, flank pain, frequency, hematuria and urgency.   Musculoskeletal: Positive for arthralgias ( hands). Negative for myalgias.   Allergic/Immunologic: Negative for environmental allergies.   Neurological: Negative for syncope, speech difficulty and light-headedness.   Hematological: Does not bruise/bleed easily.   Psychiatric/Behavioral: Positive for dysphoric mood. Negative for agitation, confusion and sleep disturbance. The patient is not nervous/anxious.        Objective   Vitals:    10/18/21 1447   BP: 102/58   Pulse: 74   Resp: 16   Temp: 97.8 °F (36.6 °C)   SpO2: 98%          10/18/21  1447   Weight: 62.6 kg (138 lb)    [unfilled]  Body mass index is 21.61 kg/m².      Physical Exam   Constitutional: She is oriented to person, place, and time. She appears well-developed.   HENT:   Head: Normocephalic and atraumatic.   Right Ear: External ear normal.   Left Ear: External ear normal.   Nose: Nose normal.    Eyes: Pupils are equal, round, and reactive to light. Conjunctivae are normal.   Neck: No JVD present. No thyromegaly present.   Cardiovascular: Normal rate, regular rhythm and normal heart sounds. Exam reveals no gallop.   No murmur heard.  Pulmonary/Chest: Effort normal and breath sounds normal. No respiratory distress. She has no wheezes. She has no rales.   Abdominal: Soft. Normal appearance and bowel sounds are normal. She exhibits no distension and no mass. There is no abdominal tenderness. There is no guarding. No hernia.   Musculoskeletal: Normal range of motion.   Lymphadenopathy:     She has no cervical adenopathy.   Neurological: She is alert and oriented to person, place, and time. She displays normal reflexes. No cranial nerve deficit. Coordination normal.   Skin: Skin is warm and dry.   Psychiatric: Her behavior is normal. Mood, judgment and thought content normal.   Nursing note and vitals reviewed.        Problems Addressed this Visit        Endocrine and Metabolic    Vitamin D deficiency       Musculoskeletal and Injuries    Osteopenia      Other Visit Diagnoses     Encounter for preventive health examination    -  Primary      Diagnoses       Codes Comments    Encounter for preventive health examination    -  Primary ICD-10-CM: Z00.00  ICD-9-CM: V70.0     Vitamin D deficiency     ICD-10-CM: E55.9  ICD-9-CM: 268.9     Osteopenia, unspecified location     ICD-10-CM: M85.80  ICD-9-CM: 733.90         Assessment/Plan   In for annual exam today.  Overall she looks great.  Annual labs today will include CBC, CMP, lipids, vitamin D level, UA.   She exercises regularly mainly with walking.  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  Follow-up one year with annual preventative exam.  2 HPP today.    Prevention counseling was performed today. The counseling performed was routine health maintenance topics including BMI and exercise.    The above information was reviewed again  today 10/18/21.  It continues to be accurate as reflected above and is unchanged.  History, physical and review of systems all reviewed and are unchanged.  Medications were reviewed today and continue the current dosing.    PPE today includes face mask and eye shield.         Dragon disclaimer:   Much of this encounter note is an electronic transcription/translation of spoken language to printed text. The electronic translation of spoken language may permit erroneous, or at times, nonsensical words or phrases to be inadvertently transcribed; Although I have reviewed the note for such errors, some may still exist.

## 2021-10-19 LAB
25(OH)D3+25(OH)D2 SERPL-MCNC: 35 NG/ML (ref 30–100)
ALBUMIN SERPL-MCNC: 4.4 G/DL (ref 3.8–4.8)
ALBUMIN/GLOB SERPL: 1.8 {RATIO} (ref 1.2–2.2)
ALP SERPL-CCNC: 68 IU/L (ref 44–121)
ALT SERPL-CCNC: 12 IU/L (ref 0–32)
AST SERPL-CCNC: 16 IU/L (ref 0–40)
BASOPHILS # BLD AUTO: 0 X10E3/UL (ref 0–0.2)
BASOPHILS NFR BLD AUTO: 0 %
BILIRUB SERPL-MCNC: <0.2 MG/DL (ref 0–1.2)
BUN SERPL-MCNC: 13 MG/DL (ref 8–27)
BUN/CREAT SERPL: 16 (ref 12–28)
CALCIUM SERPL-MCNC: 9.7 MG/DL (ref 8.7–10.3)
CHLORIDE SERPL-SCNC: 106 MMOL/L (ref 96–106)
CHOLEST SERPL-MCNC: 230 MG/DL (ref 100–199)
CHOLEST/HDLC SERPL: 3 RATIO (ref 0–4.4)
CO2 SERPL-SCNC: 25 MMOL/L (ref 20–29)
CREAT SERPL-MCNC: 0.81 MG/DL (ref 0.57–1)
EOSINOPHIL # BLD AUTO: 0.2 X10E3/UL (ref 0–0.4)
EOSINOPHIL NFR BLD AUTO: 2 %
ERYTHROCYTE [DISTWIDTH] IN BLOOD BY AUTOMATED COUNT: 12.1 % (ref 11.7–15.4)
GLOBULIN SER CALC-MCNC: 2.4 G/DL (ref 1.5–4.5)
GLUCOSE SERPL-MCNC: 89 MG/DL (ref 65–99)
HCT VFR BLD AUTO: 39.7 % (ref 34–46.6)
HDLC SERPL-MCNC: 77 MG/DL
HGB BLD-MCNC: 13.3 G/DL (ref 11.1–15.9)
IMM GRANULOCYTES # BLD AUTO: 0 X10E3/UL (ref 0–0.1)
IMM GRANULOCYTES NFR BLD AUTO: 0 %
LDLC SERPL CALC-MCNC: 139 MG/DL (ref 0–99)
LYMPHOCYTES # BLD AUTO: 2.1 X10E3/UL (ref 0.7–3.1)
LYMPHOCYTES NFR BLD AUTO: 28 %
MCH RBC QN AUTO: 31.3 PG (ref 26.6–33)
MCHC RBC AUTO-ENTMCNC: 33.5 G/DL (ref 31.5–35.7)
MCV RBC AUTO: 93 FL (ref 79–97)
MONOCYTES # BLD AUTO: 0.6 X10E3/UL (ref 0.1–0.9)
MONOCYTES NFR BLD AUTO: 8 %
NEUTROPHILS # BLD AUTO: 4.6 X10E3/UL (ref 1.4–7)
NEUTROPHILS NFR BLD AUTO: 62 %
PLATELET # BLD AUTO: 245 X10E3/UL (ref 150–450)
POTASSIUM SERPL-SCNC: 4.1 MMOL/L (ref 3.5–5.2)
PROT SERPL-MCNC: 6.8 G/DL (ref 6–8.5)
RBC # BLD AUTO: 4.25 X10E6/UL (ref 3.77–5.28)
SODIUM SERPL-SCNC: 145 MMOL/L (ref 134–144)
TRIGL SERPL-MCNC: 81 MG/DL (ref 0–149)
VLDLC SERPL CALC-MCNC: 14 MG/DL (ref 5–40)
WBC # BLD AUTO: 7.4 X10E3/UL (ref 3.4–10.8)

## 2022-02-14 ENCOUNTER — OFFICE VISIT (OUTPATIENT)
Dept: INTERNAL MEDICINE | Facility: CLINIC | Age: 67
End: 2022-02-14

## 2022-02-14 VITALS
BODY MASS INDEX: 21.82 KG/M2 | OXYGEN SATURATION: 99 % | RESPIRATION RATE: 16 BRPM | DIASTOLIC BLOOD PRESSURE: 82 MMHG | HEIGHT: 67 IN | HEART RATE: 70 BPM | SYSTOLIC BLOOD PRESSURE: 128 MMHG | TEMPERATURE: 98.7 F | WEIGHT: 139 LBS

## 2022-02-14 DIAGNOSIS — R07.9 CHEST PAIN, UNSPECIFIED TYPE: Primary | ICD-10-CM

## 2022-02-14 DIAGNOSIS — I34.1 MVP (MITRAL VALVE PROLAPSE): ICD-10-CM

## 2022-02-14 PROCEDURE — 99214 OFFICE O/P EST MOD 30 MIN: CPT | Performed by: INTERNAL MEDICINE

## 2022-02-14 PROCEDURE — 93000 ELECTROCARDIOGRAM COMPLETE: CPT | Performed by: INTERNAL MEDICINE

## 2022-02-14 RX ORDER — ALBUTEROL SULFATE 90 UG/1
2 AEROSOL, METERED RESPIRATORY (INHALATION) EVERY 4 HOURS PRN
Qty: 1 G | Refills: 0 | Status: SHIPPED | OUTPATIENT
Start: 2022-02-14 | End: 2022-10-20

## 2022-02-14 RX ORDER — MULTIVIT WITH MINERALS/LUTEIN
250 TABLET ORAL DAILY
COMMUNITY

## 2022-02-14 RX ORDER — VIT C/VIT E/LUTEIN/MIN/OMEGA-3 100-15-2
CAPSULE ORAL
COMMUNITY
End: 2022-10-20

## 2022-02-14 NOTE — PROGRESS NOTES
Subjective   Sonia Basilio is a 66 y.o. female.     Chief Complaint   Patient presents with   • Shortness of Breath     with exercise x 1 yr   • Chest Pain         Shortness of Breath  This is a chronic problem. The current episode started more than 1 year ago. The problem occurs intermittently. The problem has been gradually worsening. Pertinent negatives include no chest pain, leg swelling or wheezing.        The following portions of the patient's history were reviewed and updated as appropriate: allergies, current medications, past social history and problem list.    Outpatient Medications Marked as Taking for the 2/14/22 encounter (Office Visit) with Edward Maloney MD   Medication Sig Dispense Refill   • Cholecalciferol (VITAMIN D3) 5000 units capsule capsule Take 5,000 Units by mouth Daily.     • EVAMIST 1.53 MG/SPRAY transdermal spray USE 1 TO 2 SPRAYS IN LOWER INNER ARM DAILY  2   • Multiple Vitamins-Minerals (MULTIVITAMIN & MINERAL PO) Take 1 tablet by mouth Daily.     • Multiple Vitamins-Minerals (Ocuvite Adult Formula) capsule Take  by mouth.     • progesterone (PROMETRIUM) 200 MG capsule Take 200 mg by mouth daily.     • sertraline (ZOLOFT) 50 MG tablet Take 50 mg by mouth Daily.     • vitamin C (ASCORBIC ACID) 250 MG tablet Take 250 mg by mouth Daily.     • Zinc 50 MG capsule Take 50 mg by mouth Daily.         Review of Systems   Respiratory: Positive for shortness of breath. Negative for wheezing.    Cardiovascular: Negative for chest pain, palpitations and leg swelling.       Objective   Vitals:    02/14/22 0822   BP: 128/82   Pulse: 70   Resp: 16   Temp: 98.7 °F (37.1 °C)   SpO2: 99%      Wt Readings from Last 3 Encounters:   02/14/22 63 kg (139 lb)   10/18/21 62.6 kg (138 lb)   08/16/21 63 kg (139 lb)    Body mass index is 21.77 kg/m².      Physical Exam  Constitutional:       Appearance: Normal appearance. She is well-developed.   Neck:      Thyroid: No thyromegaly.   Cardiovascular:       Rate and Rhythm: Normal rate and regular rhythm.      Heart sounds: Normal heart sounds. No murmur heard.  No gallop.    Pulmonary:      Effort: Pulmonary effort is normal. No respiratory distress.      Breath sounds: Normal breath sounds. No wheezing or rales.   Neurological:      Mental Status: She is alert.         ECG 12 Lead    Date/Time: 2/14/2022 9:47 AM  Performed by: Edward Maloney MD  Authorized by: Edward Maloney MD   Comparison: compared with previous ECG from 10/14/2019  Similar to previous ECG  Rhythm: sinus rhythm  Rate: normal  Conduction: conduction normal  ST Segments: ST segments normal  T Waves: T waves normal  QRS axis: normal  Other: no other findings    Clinical impression: normal ECG  Comments: Normal sinus rhythm.  Heart rate is 63.  This is a normal EKG.  There is no change from the prior EKG dated 10/14/2019.                Problems Addressed this Visit        Cardiac and Vasculature    MVP (mitral valve prolapse)      Other Visit Diagnoses     Chest pain, unspecified type    -  Primary      Diagnoses       Codes Comments    Chest pain, unspecified type    -  Primary ICD-10-CM: R07.9  ICD-9-CM: 786.50     MVP (mitral valve prolapse)     ICD-10-CM: I34.1  ICD-9-CM: 424.0         Assessment/Plan   In today with complaints of exertional chest pain and shortness of breath.  Is mainly a pressure in the chest and the symptoms occur with 5 minutes of walking.  Typically about 1/8 of a mile.  Pretty consistent.  Radiates to left arm.  Occasionally radiates to right arm.  She has had this for a year or more.  She had a stress Cardiolite in November 2019 which was normal and a low risk study.  She does have a history of mitral valve prolapse.  She is particularly concerned because 1 sister has had a few stents and 2 other sisters have had ablations for irregular heartbeat.  She would like a cardiology opinion which I think is quite reasonable.  She has no cardiac risk factors other than the  family history.  Blood pressure and lipids have always been excellent.  We will recheck an EKG today.  I think the only way to proceed is going to be with a heart catheterization.  Her risk seems so low that a pulmonary source for her symptoms seems high.  We will try some albuterol prior to walking and see if it makes a difference in her exercise performance.    The above information was reviewed again today 02/14/22.  It continues to be accurate as reflected above and is unchanged.  History, physical and review of systems all reviewed and are unchanged.  Medications were reviewed today and continue the current dosing.    PPE today includes face mask and eye shield.           Dragon disclaimer:   Much of this encounter note is an electronic transcription/translation of spoken language to printed text. The electronic translation of spoken language may permit erroneous, or at times, nonsensical words or phrases to be inadvertently transcribed; Although I have reviewed the note for such errors, some may still exist.

## 2022-02-23 ENCOUNTER — OFFICE VISIT (OUTPATIENT)
Dept: CARDIOLOGY | Facility: CLINIC | Age: 67
End: 2022-02-23

## 2022-02-23 ENCOUNTER — TRANSCRIBE ORDERS (OUTPATIENT)
Dept: CARDIOLOGY | Facility: CLINIC | Age: 67
End: 2022-02-23

## 2022-02-23 ENCOUNTER — LAB (OUTPATIENT)
Dept: LAB | Facility: HOSPITAL | Age: 67
End: 2022-02-23

## 2022-02-23 VITALS
HEIGHT: 67 IN | WEIGHT: 142 LBS | DIASTOLIC BLOOD PRESSURE: 60 MMHG | BODY MASS INDEX: 22.29 KG/M2 | HEART RATE: 66 BPM | SYSTOLIC BLOOD PRESSURE: 110 MMHG

## 2022-02-23 DIAGNOSIS — Z01.810 PRE-OPERATIVE CARDIOVASCULAR EXAMINATION: ICD-10-CM

## 2022-02-23 DIAGNOSIS — Z13.6 SCREENING FOR ISCHEMIC HEART DISEASE: ICD-10-CM

## 2022-02-23 DIAGNOSIS — I20.0 UNSTABLE ANGINA: Primary | ICD-10-CM

## 2022-02-23 DIAGNOSIS — Z01.818 OTHER SPECIFIED PRE-OPERATIVE EXAMINATION: ICD-10-CM

## 2022-02-23 DIAGNOSIS — Z01.818 OTHER SPECIFIED PRE-OPERATIVE EXAMINATION: Primary | ICD-10-CM

## 2022-02-23 LAB
ANION GAP SERPL CALCULATED.3IONS-SCNC: 8 MMOL/L (ref 5–15)
BASOPHILS # BLD AUTO: 0.04 10*3/MM3 (ref 0–0.2)
BASOPHILS NFR BLD AUTO: 0.6 % (ref 0–1.5)
BUN SERPL-MCNC: 19 MG/DL (ref 8–23)
BUN/CREAT SERPL: 29.7 (ref 7–25)
CALCIUM SPEC-SCNC: 9.9 MG/DL (ref 8.6–10.5)
CHLORIDE SERPL-SCNC: 107 MMOL/L (ref 98–107)
CO2 SERPL-SCNC: 27 MMOL/L (ref 22–29)
CREAT SERPL-MCNC: 0.64 MG/DL (ref 0.57–1)
DEPRECATED RDW RBC AUTO: 42.1 FL (ref 37–54)
EOSINOPHIL # BLD AUTO: 0.16 10*3/MM3 (ref 0–0.4)
EOSINOPHIL NFR BLD AUTO: 2.3 % (ref 0.3–6.2)
ERYTHROCYTE [DISTWIDTH] IN BLOOD BY AUTOMATED COUNT: 12.2 % (ref 12.3–15.4)
GFR SERPL CREATININE-BSD FRML MDRD: 93 ML/MIN/1.73
GLUCOSE SERPL-MCNC: 95 MG/DL (ref 65–99)
HCT VFR BLD AUTO: 42.6 % (ref 34–46.6)
HGB BLD-MCNC: 13.7 G/DL (ref 12–15.9)
IMM GRANULOCYTES # BLD AUTO: 0.02 10*3/MM3 (ref 0–0.05)
IMM GRANULOCYTES NFR BLD AUTO: 0.3 % (ref 0–0.5)
LYMPHOCYTES # BLD AUTO: 2.16 10*3/MM3 (ref 0.7–3.1)
LYMPHOCYTES NFR BLD AUTO: 30.6 % (ref 19.6–45.3)
MCH RBC QN AUTO: 30 PG (ref 26.6–33)
MCHC RBC AUTO-ENTMCNC: 32.2 G/DL (ref 31.5–35.7)
MCV RBC AUTO: 93.4 FL (ref 79–97)
MONOCYTES # BLD AUTO: 0.61 10*3/MM3 (ref 0.1–0.9)
MONOCYTES NFR BLD AUTO: 8.6 % (ref 5–12)
NEUTROPHILS NFR BLD AUTO: 4.08 10*3/MM3 (ref 1.7–7)
NEUTROPHILS NFR BLD AUTO: 57.6 % (ref 42.7–76)
NRBC BLD AUTO-RTO: 0 /100 WBC (ref 0–0.2)
PLATELET # BLD AUTO: 214 10*3/MM3 (ref 140–450)
PMV BLD AUTO: 10.2 FL (ref 6–12)
POTASSIUM SERPL-SCNC: 4.5 MMOL/L (ref 3.5–5.2)
RBC # BLD AUTO: 4.56 10*6/MM3 (ref 3.77–5.28)
SODIUM SERPL-SCNC: 142 MMOL/L (ref 136–145)
WBC NRBC COR # BLD: 7.07 10*3/MM3 (ref 3.4–10.8)

## 2022-02-23 PROCEDURE — 80048 BASIC METABOLIC PNL TOTAL CA: CPT

## 2022-02-23 PROCEDURE — 99204 OFFICE O/P NEW MOD 45 MIN: CPT | Performed by: INTERNAL MEDICINE

## 2022-02-23 PROCEDURE — 85025 COMPLETE CBC W/AUTO DIFF WBC: CPT

## 2022-02-23 PROCEDURE — 36415 COLL VENOUS BLD VENIPUNCTURE: CPT

## 2022-02-23 PROCEDURE — U0004 COV-19 TEST NON-CDC HGH THRU: HCPCS

## 2022-02-23 PROCEDURE — 93000 ELECTROCARDIOGRAM COMPLETE: CPT | Performed by: INTERNAL MEDICINE

## 2022-02-23 PROCEDURE — C9803 HOPD COVID-19 SPEC COLLECT: HCPCS

## 2022-02-23 NOTE — PROGRESS NOTES
Date of Office Visit: 22  Encounter Provider: Matt Jane MD  Place of Service: [facility] CARDIOLOGY  Patient Name: Sonia Basilio  :[id number]  [id number]    Chief Complaint   Patient presents with   • Chest Pain   • Shortness of Breath   :     HPI: Sonia Baislio is a 66 y.o. female she comes in as a new patient for evaluation of chest discomfort.  She is the  at [employer] and loves doing that she is always been pretty active.  She hikes and walks regularly but in the last month or so she when she is walking she gets discomfort in the top part of her chest goes up into her jaw into her left arm she is a little short of breath with it a little bit sweaty.  She if she stops exerting herself it goes away she never has this at rest.  No stomach problems no melena hematochezia hematemesis.  She does not smoke does not have diabetes does not have hyperlipidemia does not have hypertension but there probably is a family history of coronary disease.  She has not had any lung or kidney problems    Past Medical History:   Diagnosis Date   • Allergic rhinitis    • H/O degenerative disc disease    • Medication management    • MVP (mitral valve prolapse)    • Osteopenia    • Physical exam, annual    • Sciatica    • Vitamin D deficiency        Past Surgical History:   Procedure Laterality Date   • BUNIONECTOMY Right 2015   • COLONOSCOPY N/A 4/3/2018    Procedure: COLONOSCOPY WITH POLYPECTOMY (HOT SNARE);  Surgeon: Naina Pandya MD;  Location: [facility] ENDOSCOPY;  Service: Gastroenterology   • DILATATION AND CURETTAGE         Social History     Socioeconomic History   • Marital status:    Tobacco Use   • Smoking status: Never Smoker   • Smokeless tobacco: Never Used   Substance and Sexual Activity   • Alcohol use: Yes   • Drug use: No       Family History   Problem Relation Age of Onset   • Stroke Mother    • Dementia Mother    • Hypertension Father     • Atrial fibrillation Sister    • Hyperlipidemia Sister    • Heart disease Sister    • Alcohol abuse Son    • Colon cancer Paternal Grandmother    • Heart attack Paternal Grandmother        Review of Systems   Constitutional: Negative for decreased appetite, fever, malaise/fatigue and weight loss.   HENT: Negative for nosebleeds.    Eyes: Negative for double vision.   Cardiovascular: Negative for chest pain, claudication, cyanosis, dyspnea on exertion, irregular heartbeat, leg swelling, near-syncope, orthopnea, palpitations, paroxysmal nocturnal dyspnea and syncope.   Respiratory: Negative for cough, hemoptysis and shortness of breath.    Hematologic/Lymphatic: Negative for bleeding problem.   Skin: Negative for rash.   Musculoskeletal: Negative for falls and myalgias.   Gastrointestinal: Negative for hematochezia, jaundice, melena, nausea and vomiting.   Genitourinary: Negative for hematuria.   Neurological: Negative for dizziness and seizures.   Psychiatric/Behavioral: Negative for altered mental status and memory loss.       Allergies   Allergen Reactions   • Sulfa Antibiotics Rash         Current Outpatient Medications:   •  albuterol sulfate  (90 Base) MCG/ACT inhaler, Inhale 2 puffs Every 4 (Four) Hours As Needed for Wheezing., Disp: 1 g, Rfl: 0  •  Cholecalciferol (VITAMIN D3) 5000 units capsule capsule, Take 5,000 Units by mouth Daily., Disp: , Rfl:   •  EVAMIST 1.53 MG/SPRAY transdermal spray, USE 1 TO 2 SPRAYS IN LOWER INNER ARM DAILY, Disp: , Rfl: 2  •  Multiple Vitamins-Minerals (MULTIVITAMIN & MINERAL PO), Take 1 tablet by mouth Daily., Disp: , Rfl:   •  Multiple Vitamins-Minerals (Ocuvite Adult Formula) capsule, Take  by mouth., Disp: , Rfl:   •  progesterone (PROMETRIUM) 200 MG capsule, Take 200 mg by mouth daily., Disp: , Rfl:   •  sertraline (ZOLOFT) 50 MG tablet, Take 50 mg by mouth Daily., Disp: , Rfl:   •  Turmeric 500 MG capsule, Take 1 tablet by mouth Daily., Disp: , Rfl:   •  vitamin  "C (ASCORBIC ACID) 250 MG tablet, Take 250 mg by mouth Daily., Disp: , Rfl:   •  Zinc 50 MG capsule, Take 50 mg by mouth Daily., Disp: , Rfl:       Objective:     Vitals:    02/23/22 0939   BP: 110/60   Pulse: 66   Weight: 64.4 kg (142 lb)   Height: 170.2 cm (67\")     Body mass index is 22.24 kg/m².    Constitutional:       Appearance: Well-developed.   Eyes:      General: No scleral icterus.  HENT:      Head: Normocephalic.   Neck:      Thyroid: No thyromegaly.      Vascular: No JVD.      Lymphadenopathy: No cervical adenopathy.   Pulmonary:      Effort: Pulmonary effort is normal.      Breath sounds: Normal breath sounds. No wheezing. No rales.   Cardiovascular:      Normal rate. Regular rhythm.      No gallop.   Edema:     Peripheral edema absent.   Abdominal:      Palpations: Abdomen is soft.      Tenderness: There is no abdominal tenderness.   Musculoskeletal: Normal range of motion. Skin:     General: Skin is warm and dry.      Findings: No rash.   Neurological:      Mental Status: Alert and oriented to person, place, and time.           ECG 12 Lead    Date/Time: 2/23/2022 10:18 AM  Performed by: Matt Jane MD  Authorized by: Matt Jane MD   Comparison: compared with previous ECG   Similar to previous ECG  Rhythm: sinus rhythm    Clinical impression: normal ECG             Assessment:       Diagnosis Plan   1. Unstable angina (HCC)            Plan:       So I this lady seems like she has classic unstable angina and actually she is having a little bit more angina and it is happening easier and is a little more more severe than it used to be.  I think we should just proceed on with diagnostic left heart cath.  I do not think I would trust a stress test even if it was negative.  I talked with her about the risk versus benefits of this approach will do it from the right radial artery she understands and agrees I talked with her internist he also agrees if she does have coronary disease we will put her " on a statin otherwise well.  I will have her come back and see us depending on what we find with the cath if the cath is normal when to try her on some nitrates    No follow-ups on file.     As always, it has been a pleasure to participate in your patient's care.      Sincerely,       Matt Jane MD

## 2022-02-24 ENCOUNTER — HOSPITAL ENCOUNTER (OUTPATIENT)
Facility: HOSPITAL | Age: 67
Setting detail: HOSPITAL OUTPATIENT SURGERY
Discharge: HOME | End: 2022-02-24
Attending: INTERNAL MEDICINE | Admitting: INTERNAL MEDICINE

## 2022-02-24 VITALS
TEMPERATURE: 97.4 F | OXYGEN SATURATION: 97 % | WEIGHT: 140 LBS | BODY MASS INDEX: 21.97 KG/M2 | HEIGHT: 67 IN | SYSTOLIC BLOOD PRESSURE: 111 MMHG | DIASTOLIC BLOOD PRESSURE: 63 MMHG | HEART RATE: 69 BPM | RESPIRATION RATE: 16 BRPM

## 2022-02-24 DIAGNOSIS — I20.0 UNSTABLE ANGINA: ICD-10-CM

## 2022-02-24 LAB — SARS-COV-2 ORF1AB RESP QL NAA+PROBE: NOT DETECTED

## 2022-02-24 PROCEDURE — 25010000002 MIDAZOLAM PER 1 MG: Performed by: INTERNAL MEDICINE

## 2022-02-24 PROCEDURE — C1769 GUIDE WIRE: HCPCS | Performed by: INTERNAL MEDICINE

## 2022-02-24 PROCEDURE — 25010000002 FENTANYL CITRATE (PF) 50 MCG/ML SOLUTION: Performed by: INTERNAL MEDICINE

## 2022-02-24 PROCEDURE — 93458 L HRT ARTERY/VENTRICLE ANGIO: CPT | Performed by: INTERNAL MEDICINE

## 2022-02-24 PROCEDURE — C1894 INTRO/SHEATH, NON-LASER: HCPCS | Performed by: INTERNAL MEDICINE

## 2022-02-24 PROCEDURE — 0 IOPAMIDOL PER 1 ML: Performed by: INTERNAL MEDICINE

## 2022-02-24 PROCEDURE — 25010000002 HEPARIN (PORCINE) PER 1000 UNITS: Performed by: INTERNAL MEDICINE

## 2022-02-24 PROCEDURE — 99152 MOD SED SAME PHYS/QHP 5/>YRS: CPT | Performed by: INTERNAL MEDICINE

## 2022-02-24 RX ORDER — LIDOCAINE HYDROCHLORIDE 20 MG/ML
INJECTION, SOLUTION INFILTRATION; PERINEURAL AS NEEDED
Status: DISCONTINUED | OUTPATIENT
Start: 2022-02-24 | End: 2022-02-24 | Stop reason: HOSPADM

## 2022-02-24 RX ORDER — FENTANYL CITRATE 50 UG/ML
INJECTION, SOLUTION INTRAMUSCULAR; INTRAVENOUS AS NEEDED
Status: DISCONTINUED | OUTPATIENT
Start: 2022-02-24 | End: 2022-02-24 | Stop reason: HOSPADM

## 2022-02-24 RX ORDER — ROSUVASTATIN CALCIUM 5 MG/1
5 TABLET, COATED ORAL DAILY
Qty: 30 TABLET | Refills: 6 | Status: SHIPPED | OUTPATIENT
Start: 2022-02-24 | End: 2022-10-25

## 2022-02-24 RX ORDER — ACETAMINOPHEN 325 MG/1
650 TABLET ORAL EVERY 4 HOURS PRN
Status: DISCONTINUED | OUTPATIENT
Start: 2022-02-24 | End: 2022-02-24 | Stop reason: HOSPADM

## 2022-02-24 RX ORDER — SODIUM CHLORIDE 0.9 % (FLUSH) 0.9 %
10 SYRINGE (ML) INJECTION AS NEEDED
Status: DISCONTINUED | OUTPATIENT
Start: 2022-02-24 | End: 2022-02-24 | Stop reason: HOSPADM

## 2022-02-24 RX ORDER — SODIUM CHLORIDE 0.9 % (FLUSH) 0.9 %
3 SYRINGE (ML) INJECTION EVERY 12 HOURS SCHEDULED
Status: DISCONTINUED | OUTPATIENT
Start: 2022-02-24 | End: 2022-02-24 | Stop reason: HOSPADM

## 2022-02-24 RX ORDER — MIDAZOLAM HYDROCHLORIDE 1 MG/ML
INJECTION INTRAMUSCULAR; INTRAVENOUS AS NEEDED
Status: DISCONTINUED | OUTPATIENT
Start: 2022-02-24 | End: 2022-02-24 | Stop reason: HOSPADM

## 2022-02-24 RX ORDER — SODIUM CHLORIDE 0.9 % (FLUSH) 0.9 %
10 SYRINGE (ML) INJECTION EVERY 12 HOURS SCHEDULED
Status: DISCONTINUED | OUTPATIENT
Start: 2022-02-24 | End: 2022-02-24 | Stop reason: HOSPADM

## 2022-02-24 RX ORDER — LIDOCAINE HYDROCHLORIDE 10 MG/ML
0.1 INJECTION, SOLUTION EPIDURAL; INFILTRATION; INTRACAUDAL; PERINEURAL ONCE AS NEEDED
Status: DISCONTINUED | OUTPATIENT
Start: 2022-02-24 | End: 2022-02-24 | Stop reason: HOSPADM

## 2022-02-24 RX ORDER — SODIUM CHLORIDE 9 MG/ML
75 INJECTION, SOLUTION INTRAVENOUS CONTINUOUS
Status: DISCONTINUED | OUTPATIENT
Start: 2022-02-24 | End: 2022-02-24 | Stop reason: HOSPADM

## 2022-02-24 RX ORDER — ISOSORBIDE MONONITRATE 30 MG/1
15 TABLET, EXTENDED RELEASE ORAL EVERY MORNING
Qty: 30 TABLET | Refills: 6 | Status: SHIPPED | OUTPATIENT
Start: 2022-02-24 | End: 2022-10-20 | Stop reason: SINTOL

## 2022-02-24 RX ADMIN — SODIUM CHLORIDE 75 ML/HR: 9 INJECTION, SOLUTION INTRAVENOUS at 07:21

## 2022-03-03 ENCOUNTER — TELEPHONE (OUTPATIENT)
Dept: CARDIOLOGY | Facility: CLINIC | Age: 67
End: 2022-03-03

## 2022-03-03 NOTE — TELEPHONE ENCOUNTER
Patient calling was put on Imdur and Crestor  States she was having headaches and right ear was hurting.  She has stopped the imdur and this has gone away.  Wanting to know if this is ok to stay off of. She has an appt on 3/29/22 with you.     [phone]

## 2022-03-03 NOTE — TELEPHONE ENCOUNTER
Called patient and told her per WD headaches are a side effect from imdur and she can stop the Imdur

## 2022-03-29 ENCOUNTER — OFFICE VISIT (OUTPATIENT)
Dept: CARDIOLOGY | Facility: CLINIC | Age: 67
End: 2022-03-29

## 2022-03-29 VITALS
BODY MASS INDEX: 22.29 KG/M2 | DIASTOLIC BLOOD PRESSURE: 68 MMHG | SYSTOLIC BLOOD PRESSURE: 112 MMHG | WEIGHT: 142 LBS | HEART RATE: 67 BPM | HEIGHT: 67 IN

## 2022-03-29 DIAGNOSIS — R07.2 PRECORDIAL PAIN: Primary | ICD-10-CM

## 2022-03-29 PROCEDURE — 99214 OFFICE O/P EST MOD 30 MIN: CPT | Performed by: INTERNAL MEDICINE

## 2022-03-29 PROCEDURE — 93000 ELECTROCARDIOGRAM COMPLETE: CPT | Performed by: INTERNAL MEDICINE

## 2022-03-29 NOTE — PROGRESS NOTES
Date of Office Visit: 22  Encounter Provider: Matt Jane MD  Place of Service: [facility] CARDIOLOGY  Patient Name: Sonia Basilio  :1955  [id number]    Chief Complaint   Patient presents with   • Chest Pain   :     HPI: Sonia Basilio is a 67 y.o. female she came in about a month ago with chest discomfort very suggestive of angina and we ended up taken to the Cath Lab she had some luminal irregularities in the right coronary the others were normal LV function was normal I felt like it could be small vessel coronary disease so we started on a little bit of Imdur and unfortunately she was not able to tolerate it because of headaches.  She is feeling better she still occasionally does get some chest discomfort now but it is much better not positional not related to food it does not seem to be GI she is feeling better I think knowing that her heart is okay    Past Medical History:   Diagnosis Date   • Allergic rhinitis    • H/O degenerative disc disease    • Medication management    • MVP (mitral valve prolapse)    • Osteopenia    • Physical exam, annual    • Sciatica    • Vitamin D deficiency        Past Surgical History:   Procedure Laterality Date   • BUNIONECTOMY Right 2015   • CARDIAC CATHETERIZATION N/A 2022    Procedure: Left Heart Cath;  Surgeon: Matt Jane MD;  Location: [facility] CATH INVASIVE LOCATION;  Service: Cardiology;  Laterality: N/A;   • CARDIAC CATHETERIZATION N/A 2022    Procedure: Coronary angiography;  Surgeon: Matt Jane MD;  Location: [facility] CATH INVASIVE LOCATION;  Service: Cardiology;  Laterality: N/A;   • CARDIAC CATHETERIZATION N/A 2022    Procedure: Left ventriculography;  Surgeon: Matt Jane MD;  Location: [facility] CATH INVASIVE LOCATION;  Service: Cardiology;  Laterality: N/A;   • COLONOSCOPY N/A 4/3/2018    Procedure: COLONOSCOPY WITH POLYPECTOMY (HOT SNARE);  Surgeon: Naina Pandya MD;   Location: [facility] ENDOSCOPY;  Service: Gastroenterology   • DILATATION AND CURETTAGE     • SKIN CANCER EXCISION Left     ARM       Social History     Socioeconomic History   • Marital status:    Tobacco Use   • Smoking status: Never Smoker   • Smokeless tobacco: Never Used   Substance and Sexual Activity   • Alcohol use: Yes     Comment: OCCASIONALLY   • Drug use: No   • Sexual activity: Defer       Family History   Problem Relation Age of Onset   • Stroke Mother    • Dementia Mother    • Hypertension Father    • Atrial fibrillation Sister    • Hyperlipidemia Sister    • Heart disease Sister    • Alcohol abuse Son    • Colon cancer Paternal Grandmother    • Heart attack Paternal Grandmother        Review of Systems   Constitutional: Negative for decreased appetite, fever, malaise/fatigue and weight loss.   HENT: Negative for nosebleeds.    Eyes: Negative for double vision.   Cardiovascular: Negative for chest pain, claudication, cyanosis, dyspnea on exertion, irregular heartbeat, leg swelling, near-syncope, orthopnea, palpitations, paroxysmal nocturnal dyspnea and syncope.   Respiratory: Negative for cough, hemoptysis and shortness of breath.    Hematologic/Lymphatic: Negative for bleeding problem.   Skin: Negative for rash.   Musculoskeletal: Negative for falls and myalgias.   Gastrointestinal: Negative for hematochezia, jaundice, melena, nausea and vomiting.   Genitourinary: Negative for hematuria.   Neurological: Negative for dizziness and seizures.   Psychiatric/Behavioral: Negative for altered mental status and memory loss.       Allergies   Allergen Reactions   • Sulfa Antibiotics Rash         Current Outpatient Medications:   •  albuterol sulfate  (90 Base) MCG/ACT inhaler, Inhale 2 puffs Every 4 (Four) Hours As Needed for Wheezing., Disp: 1 g, Rfl: 0  •  Cholecalciferol (VITAMIN D3) 5000 units capsule capsule, Take 5,000 Units by mouth Daily., Disp: , Rfl:   •  EVAMIST 1.53 MG/SPRAY transdermal  "spray, USE 1 TO 2 SPRAYS IN LOWER INNER ARM DAILY, Disp: , Rfl: 2  •  Multiple Vitamins-Minerals (MULTIVITAMIN & MINERAL PO), Take 1 tablet by mouth Daily., Disp: , Rfl:   •  Multiple Vitamins-Minerals (Ocuvite Adult Formula) capsule, Take  by mouth., Disp: , Rfl:   •  progesterone (PROMETRIUM) 200 MG capsule, Take 200 mg by mouth daily., Disp: , Rfl:   •  rosuvastatin (CRESTOR) 5 MG tablet, Take 1 tablet by mouth Daily., Disp: 30 tablet, Rfl: 6  •  sertraline (ZOLOFT) 50 MG tablet, Take 50 mg by mouth Daily., Disp: , Rfl:   •  Turmeric 500 MG capsule, Take 1 tablet by mouth Daily., Disp: , Rfl:   •  vitamin C (ASCORBIC ACID) 250 MG tablet, Take 250 mg by mouth Daily., Disp: , Rfl:   •  isosorbide mononitrate (IMDUR) 30 MG 24 hr tablet, Take 0.5 tablets by mouth Every Morning., Disp: 30 tablet, Rfl: 6  •  Zinc 50 MG capsule, Take 50 mg by mouth Daily., Disp: , Rfl:       Objective:     Vitals:    03/29/22 1500   BP: 112/68   Pulse: 67   Weight: 64.4 kg (142 lb)   Height: 170.2 cm (67\")     Body mass index is 22.24 kg/m².    Constitutional:       Appearance: Well-developed.   Eyes:      General: No scleral icterus.  HENT:      Head: Normocephalic.   Neck:      Thyroid: No thyromegaly.      Vascular: No JVD.      Lymphadenopathy: No cervical adenopathy.   Pulmonary:      Effort: Pulmonary effort is normal.      Breath sounds: Normal breath sounds. No wheezing. No rales.   Cardiovascular:      Normal rate. Regular rhythm.      No gallop.   Edema:     Peripheral edema absent.   Abdominal:      Palpations: Abdomen is soft.      Tenderness: There is no abdominal tenderness.   Musculoskeletal: Normal range of motion. Skin:     General: Skin is warm and dry.      Findings: No rash.   Neurological:      Mental Status: Alert and oriented to person, place, and time.           ECG 12 Lead    Date/Time: 3/29/2022 3:50 PM  Performed by: Matt Jane MD  Authorized by: Matt Jane MD   Comparison: compared with previous " ECG   Similar to previous ECG  Rhythm: sinus rhythm  Ectopy: unifocal PVCs    Clinical impression: abnormal EKG             Assessment:       Diagnosis Plan   1. Precordial pain            Plan:       I think he is doing well I think that this still could be small vessel coronary disease she is pretty asymptomatic unfortunately she could not take nitrates.  At this point we are going to just watch things if things get worse then we will try Ranexa her blood pressure does not really allow the addition of other antianginal medicines.  I am going to have her come back and see us in a year sooner if she has trouble    Return in about 1 year (around 3/29/2023).     As always, it has been a pleasure to participate in your patient's care.      Sincerely,       Matt Jane MD

## 2022-05-12 ENCOUNTER — APPOINTMENT (OUTPATIENT)
Dept: WOMENS IMAGING | Facility: HOSPITAL | Age: 67
End: 2022-05-12

## 2022-05-12 PROCEDURE — 77067 SCR MAMMO BI INCL CAD: CPT | Performed by: RADIOLOGY

## 2022-05-12 PROCEDURE — 77063 BREAST TOMOSYNTHESIS BI: CPT | Performed by: RADIOLOGY

## 2022-06-03 ENCOUNTER — TELEMEDICINE (OUTPATIENT)
Dept: INTERNAL MEDICINE | Facility: CLINIC | Age: 67
End: 2022-06-03

## 2022-06-03 VITALS — WEIGHT: 136 LBS | BODY MASS INDEX: 21.3 KG/M2

## 2022-06-03 DIAGNOSIS — J06.9 VIRAL UPPER RESPIRATORY TRACT INFECTION: Primary | ICD-10-CM

## 2022-06-03 PROCEDURE — 99213 OFFICE O/P EST LOW 20 MIN: CPT | Performed by: INTERNAL MEDICINE

## 2022-06-03 RX ORDER — BENZONATATE 200 MG/1
200 CAPSULE ORAL 3 TIMES DAILY PRN
Qty: 30 CAPSULE | Refills: 0 | Status: SHIPPED | OUTPATIENT
Start: 2022-06-03 | End: 2022-06-15 | Stop reason: SDUPTHER

## 2022-06-03 RX ORDER — PROGESTERONE 200 MG/1
CAPSULE ORAL
COMMUNITY
Start: 2022-05-03

## 2022-06-03 NOTE — PROGRESS NOTES
"You have chosen to receive care through a telehealth visit.  Do you consent to use a video/audio connection for your medical care today. \"Yes\"    "

## 2022-06-03 NOTE — PROGRESS NOTES
Subjective   Sonia Basilio is a 67 y.o. female.     Chief Complaint   Patient presents with   • Cough   • Nasal Congestion     Patient states that she has been having symptoms for one week. Patient states that she was in contact with two individuals with Covid, and she took a Covid test 1 week ago 3 days after exposure.    • Facial Swelling   • Headache         Cough  This is a new problem. The current episode started in the past 7 days. Associated symptoms include postnasal drip. Pertinent negatives include no shortness of breath.        The following portions of the patient's history were reviewed and updated as appropriate: allergies, current medications, past social history and problem list.    Outpatient Medications Marked as Taking for the 6/3/22 encounter (Telemedicine) with Edward Maloney MD   Medication Sig Dispense Refill   • albuterol sulfate  (90 Base) MCG/ACT inhaler Inhale 2 puffs Every 4 (Four) Hours As Needed for Wheezing. 1 g 0   • Cholecalciferol (VITAMIN D3) 5000 units capsule capsule Take 5,000 Units by mouth Daily.     • EVAMIST 1.53 MG/SPRAY transdermal spray USE 1 TO 2 SPRAYS IN LOWER INNER ARM DAILY  2   • isosorbide mononitrate (IMDUR) 30 MG 24 hr tablet Take 0.5 tablets by mouth Every Morning. 30 tablet 6   • Multiple Vitamins-Minerals (MULTIVITAMIN & MINERAL PO) Take 1 tablet by mouth Daily.     • Multiple Vitamins-Minerals (Ocuvite Adult Formula) capsule Take  by mouth.     • progesterone (PROMETRIUM) 200 MG capsule Take 200 mg by mouth daily.     • Progesterone (PROMETRIUM) 200 MG capsule TAKE 1 CAPSULE BY MOUTH ONCE DAILY ON DAYS 1-12 OF EACH MONTH     • rosuvastatin (CRESTOR) 5 MG tablet Take 1 tablet by mouth Daily. 30 tablet 6   • sertraline (ZOLOFT) 50 MG tablet Take 50 mg by mouth Daily.     • Turmeric 500 MG capsule Take 1 tablet by mouth Daily.     • vitamin C (ASCORBIC ACID) 250 MG tablet Take 250 mg by mouth Daily.         Review of Systems   HENT:  Positive for postnasal drip. Negative for sinus pain.    Respiratory: Negative for shortness of breath.        Objective   There were no vitals filed for this visit.   Wt Readings from Last 3 Encounters:   06/03/22 61.7 kg (136 lb)   03/29/22 64.4 kg (142 lb)   02/24/22 63.5 kg (140 lb)    Body mass index is 21.3 kg/m².      Physical Exam  Constitutional:       Appearance: Normal appearance.   Pulmonary:      Effort: Pulmonary effort is normal.   Neurological:      Mental Status: She is alert.   Psychiatric:         Mood and Affect: Mood normal.         Behavior: Behavior normal.         Thought Content: Thought content normal.         Judgment: Judgment normal.           Problems Addressed this Visit    None     Visit Diagnoses     Viral upper respiratory tract infection    -  Primary      Diagnoses       Codes Comments    Viral upper respiratory tract infection    -  Primary ICD-10-CM: J06.9  ICD-9-CM: 465.9         Assessment & Plan   Video visit today due to COVID pandemic.  She is got a 6-day illness.  Head congestion.  Postnasal drip.  Cough with clear sputum production.  Feels achy in the upper neck area like her lymph nodes might be swollen.  No fever.  Some chills.  Tested negative for COVID at the onset of the illness.  We will plan to treat symptomatically at this point.  Xyzal 5 mg as needed.  We will send in a prescription for Tessalon cough Perles to help with her cough.  She is leaving for [place] in about 3 weeks and wonders about whether she should go ahead with her COVID shot now or wait.  Advised to wait until her symptoms are better.  We discussed proper quarantining in case she actually has COVID that was missed.  I would include 5 days from onset of symptoms to also include improving symptoms and lack of fever.  A full 10 days of masking.  However we did not have a COVID diagnosis right now.  [employer] platform used for the visit today.    The above information was reviewed again today 06/03/22.   It continues to be accurate as reflected above and is unchanged.  History, physical and review of systems all reviewed and are unchanged.  Medications were reviewed today and continue the current dosing.               [employer] disclaimer:   Much of this encounter note is an electronic transcription/translation of spoken language to printed text. The electronic translation of spoken language may permit erroneous, or at times, nonsensical words or phrases to be inadvertently transcribed; Although I have reviewed the note for such errors, some may still exist.

## 2022-06-15 RX ORDER — BENZONATATE 200 MG/1
200 CAPSULE ORAL 3 TIMES DAILY PRN
Qty: 30 CAPSULE | Refills: 0 | Status: SHIPPED | OUTPATIENT
Start: 2022-06-15 | End: 2022-10-20

## 2022-06-15 NOTE — TELEPHONE ENCOUNTER
Caller: Sonia Basilio    Relationship: Self    Best call back number: [phone]    Requested Prescriptions:   Requested Prescriptions     Pending Prescriptions Disp Refills   • benzonatate (TESSALON) 200 MG capsule 30 capsule 0     Sig: Take 1 capsule by mouth 3 (Three) Times a Day As Needed for Cough.        Pharmacy where request should be sent: [facility] [address] [place] [address] [id number] [place] [address] [phone] [facility] [phone]      Additional details provided by patient: PATIENT IS GOING OUT OF THE COUNTRY ON 06/25/2022 AND WOULD LIKE A REFILL OF HER PRESCRIPTION SO SHE CAN TAKE THEM WITH HER. PLEASE ADVISE.     Does the patient have less than a 3 day supply:  [x] Yes  [] No    Florin Royal Rep   06/15/22 12:58 EDT

## 2022-10-20 ENCOUNTER — OFFICE VISIT (OUTPATIENT)
Dept: INTERNAL MEDICINE | Facility: CLINIC | Age: 67
End: 2022-10-20

## 2022-10-20 VITALS
DIASTOLIC BLOOD PRESSURE: 72 MMHG | WEIGHT: 142 LBS | HEIGHT: 67 IN | BODY MASS INDEX: 22.29 KG/M2 | SYSTOLIC BLOOD PRESSURE: 122 MMHG | TEMPERATURE: 97.1 F | HEART RATE: 70 BPM | OXYGEN SATURATION: 98 % | RESPIRATION RATE: 16 BRPM

## 2022-10-20 DIAGNOSIS — Z00.00 ENCOUNTER FOR PREVENTIVE HEALTH EXAMINATION: Primary | ICD-10-CM

## 2022-10-20 DIAGNOSIS — E55.9 VITAMIN D DEFICIENCY: ICD-10-CM

## 2022-10-20 DIAGNOSIS — Z23 NEED FOR VACCINATION: ICD-10-CM

## 2022-10-20 LAB
CLARITY, POC: CLEAR
COLOR UR: YELLOW
EXPIRATION DATE: NORMAL
GLUCOSE UR STRIP-MCNC: NEGATIVE MG/DL
Lab: NORMAL
PH UR: 5.5 [PH] (ref 5–8)
PROT UR STRIP-MCNC: NEGATIVE MG/DL
RBC # UR STRIP: NEGATIVE /UL
SP GR UR: 1.02 (ref 1–1.03)

## 2022-10-20 PROCEDURE — 0124A PR ADM SARSCOV2 30MCG/0.3ML BST: CPT | Performed by: INTERNAL MEDICINE

## 2022-10-20 PROCEDURE — 99397 PER PM REEVAL EST PAT 65+ YR: CPT | Performed by: INTERNAL MEDICINE

## 2022-10-20 PROCEDURE — 91312 COVID-19 (PFIZER) BIVALENT BOOSTER 12+YRS: CPT | Performed by: INTERNAL MEDICINE

## 2022-10-20 PROCEDURE — 81003 URINALYSIS AUTO W/O SCOPE: CPT | Performed by: INTERNAL MEDICINE

## 2022-10-20 RX ORDER — ESTRADIOL 0.75 MG/1.25G
1.25 GEL, METERED TOPICAL DAILY
COMMUNITY

## 2022-10-20 NOTE — PROGRESS NOTES
Advance Care Planning   ACP discussion was held with the patient during this visit. Patient has an advance directive in EMR which is still valid.

## 2022-10-20 NOTE — PROGRESS NOTES
Subjective   Sonia Basilio is a 67 y.o. female.     Chief Complaint   Patient presents with   • Annual Exam   • Chest Tightness     Intermittent since Feb 2022         History of Present Illness  In for annual preventative exam.  Overall she feels pretty well.  Sleeps about 7 hours per night.  Somewhat interrupted and she is usually up twice.   She exercises 5 day weekly mainly walking.  Energy is could be better.  Periods of fatigue.  Diet is well-balanced.  Fatigue  This is a chronic problem. The current episode started more than 1 year ago. Associated symptoms include arthralgias ( hands) and chest pain. Pertinent negatives include no abdominal pain, chills, coughing, diaphoresis, fatigue, fever, headaches, myalgias, nausea, numbness, vomiting or weakness.        The following portions of the patient's history were reviewed and updated as appropriate: allergies, current medications, past social history and problem list.    HISTORY  Outpatient Medications Marked as Taking for the 10/20/22 encounter (Office Visit) with Edward Maloney MD   Medication Sig Dispense Refill   • Cholecalciferol (VITAMIN D3) 5000 units capsule capsule Take 5,000 Units by mouth Daily.     • Estradiol (Estrogel) 0.75 MG/1.25 GM (0.06%) topical gel Place 1.25 g on the skin as directed by provider Daily.     • EVAMIST 1.53 MG/SPRAY transdermal spray USE 1 TO 2 SPRAYS IN LOWER INNER ARM DAILY  2   • Multiple Vitamins-Minerals (MULTIVITAMIN & MINERAL PO) Take 1 tablet by mouth Daily.     • Progesterone (PROMETRIUM) 200 MG capsule TAKE 1 CAPSULE BY MOUTH ONCE DAILY ON DAYS 1-12 OF EACH MONTH     • rosuvastatin (CRESTOR) 5 MG tablet Take 1 tablet by mouth Daily. 30 tablet 6   • sertraline (ZOLOFT) 50 MG tablet Take 50 mg by mouth Daily.     • vitamin C (ASCORBIC ACID) 250 MG tablet Take 250 mg by mouth Daily.     • Zinc 50 MG capsule Take 50 mg by mouth Daily.       Social History     Socioeconomic History   • Marital status:     Tobacco Use   • Smoking status: Never   • Smokeless tobacco: Never   Substance and Sexual Activity   • Alcohol use: Yes     Comment: OCCASIONALLY   • Drug use: No   • Sexual activity: Defer     Family History   Problem Relation Age of Onset   • Stroke Mother    • Dementia Mother    • Hypertension Father    • Atrial fibrillation Sister    • Hyperlipidemia Sister    • Heart disease Sister    • Alcohol abuse Son    • Colon cancer Paternal Grandmother    • Heart attack Paternal Grandmother      Past Medical History:   Diagnosis Date   • Allergic rhinitis    • H/O degenerative disc disease    • Medication management    • MVP (mitral valve prolapse)    • Osteopenia    • Physical exam, annual    • Sciatica    • Vitamin D deficiency      Past Surgical History:   Procedure Laterality Date   • BUNIONECTOMY Right 06/2015   • CARDIAC CATHETERIZATION N/A 2/24/2022    Procedure: Left Heart Cath;  Surgeon: Matt Jane MD;  Location: [facility] CATH INVASIVE LOCATION;  Service: Cardiology;  Laterality: N/A;   • CARDIAC CATHETERIZATION N/A 2/24/2022    Procedure: Coronary angiography;  Surgeon: Matt Jane MD;  Location: [facility] CATH INVASIVE LOCATION;  Service: Cardiology;  Laterality: N/A;   • CARDIAC CATHETERIZATION N/A 2/24/2022    Procedure: Left ventriculography;  Surgeon: Matt Jane MD;  Location: [facility] CATH INVASIVE LOCATION;  Service: Cardiology;  Laterality: N/A;   • COLONOSCOPY N/A 4/3/2018    Procedure: COLONOSCOPY WITH POLYPECTOMY (HOT SNARE);  Surgeon: Naina Pandya MD;  Location: [facility] ENDOSCOPY;  Service: Gastroenterology   • DILATATION AND CURETTAGE     • SKIN CANCER EXCISION Left     ARM       Review of Systems   Constitutional: Negative for appetite change, chills, diaphoresis, fatigue, fever and unexpected weight change.   Respiratory: Negative for cough, chest tightness, shortness of breath and wheezing.    Cardiovascular: Positive for chest pain. Negative for palpitations and leg swelling.    Gastrointestinal: Negative for abdominal pain, anal bleeding, blood in stool, constipation, diarrhea, nausea, rectal pain and vomiting.   Endocrine: Negative for cold intolerance, heat intolerance and polyuria.   Genitourinary: Negative for difficulty urinating, dysuria, flank pain, frequency, hematuria and urgency.   Musculoskeletal: Positive for arthralgias ( hands). Negative for back pain and myalgias.   Allergic/Immunologic: Negative for environmental allergies.   Neurological: Negative for dizziness, syncope, speech difficulty, weakness, light-headedness, numbness and headaches.   Hematological: Does not bruise/bleed easily.   Psychiatric/Behavioral: Negative for agitation, confusion, dysphoric mood and sleep disturbance. The patient is not nervous/anxious.        Objective   Vitals:    10/20/22 1527   BP: 122/72   Pulse: 70   Resp: 16   Temp: 97.1 °F (36.2 °C)   SpO2: 98%          10/20/22  1527   Weight: 64.4 kg (142 lb)    [unfilled]  Body mass index is 22.24 kg/m².      Physical Exam   Constitutional: She is oriented to person, place, and time. She appears well-developed.   HENT:   Head: Normocephalic and atraumatic.   Right Ear: External ear normal.   Left Ear: External ear normal.   Nose: Nose normal.   Eyes: Pupils are equal, round, and reactive to light. Conjunctivae are normal.   Neck: No JVD present. No thyromegaly present.   Cardiovascular: Normal rate, regular rhythm and normal heart sounds. Exam reveals no gallop.   No murmur heard.  Pulmonary/Chest: Effort normal and breath sounds normal. No respiratory distress. She has no wheezes. She has no rales.   Abdominal: Soft. Normal appearance and bowel sounds are normal. She exhibits no distension and no mass. There is no abdominal tenderness. There is no guarding. No hernia.   Musculoskeletal: Normal range of motion.   Lymphadenopathy:     She has no cervical adenopathy.   Neurological: She is alert and oriented to person, place, and time. She  displays normal reflexes. No cranial nerve deficit. Coordination normal.   Skin: Skin is warm and dry.   Psychiatric: Her behavior is normal. Mood, judgment and thought content normal.   Nursing note and vitals reviewed.        Problems Addressed this Visit    None  Visit Diagnoses     Encounter for preventive health examination    -  Primary    Relevant Orders    POCT urinalysis dipstick, automated    Need for vaccination        Relevant Orders    COVID-19 Bivalent Booster (Pfizer) 12+yrs (Completed)      Diagnoses       Codes Comments    Encounter for preventive health examination    -  Primary ICD-10-CM: Z00.00  ICD-9-CM: V70.0     Need for vaccination     ICD-10-CM: Z23  ICD-9-CM: V05.9         Assessment & Plan   In for annual preventive exam today October 2022.  Overall she looks great.  Annual labs today will include CBC, CMP, lipids, vitamin D level, UA.   She exercises regularly mainly with walking.  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  Follow-up one year with annual preventative exam.  In the past year she was having some exertional chest pain and had a heart catheterization in February 2022 which was unremarkable.  Tried isosorbide for a while in case this was small vessel disease but it caused headaches.  She discontinued it.  Symptoms have improved although not resolved completely.  The symptoms are a heaviness which does raise the issue of pulmonary asthma mediated symptomatology.  Radiation to the arms is unusual however.  She will try taking some albuterol before walking and see if it improves her walking distance or symptoms.  4.75 HPP today.    Prevention counseling was performed today. The counseling performed was routine health maintenance topics including BMI and exercise.    The above information was reviewed again today 10/20/22.  It continues to be accurate as reflected above and is unchanged.  History, physical and review of systems all reviewed and are  unchanged.  Medications were reviewed today and continue the current dosing.    PPE today includes face mask and eye shield.           Dragon disclaimer:   Much of this encounter note is an electronic transcription/translation of spoken language to printed text. The electronic translation of spoken language may permit erroneous, or at times, nonsensical words or phrases to be inadvertently transcribed; Although I have reviewed the note for such errors, some may still exist.

## 2022-10-21 LAB
25(OH)D3+25(OH)D2 SERPL-MCNC: 44.3 NG/ML (ref 30–100)
ALBUMIN SERPL-MCNC: 4.7 G/DL (ref 3.5–5.2)
ALBUMIN/GLOB SERPL: 2.2 G/DL
ALP SERPL-CCNC: 71 U/L (ref 39–117)
ALT SERPL-CCNC: 21 U/L (ref 1–33)
AST SERPL-CCNC: 23 U/L (ref 1–32)
BASOPHILS # BLD AUTO: 0.04 10*3/MM3 (ref 0–0.2)
BASOPHILS NFR BLD AUTO: 0.5 % (ref 0–1.5)
BILIRUB SERPL-MCNC: 0.2 MG/DL (ref 0–1.2)
BUN SERPL-MCNC: 18 MG/DL (ref 8–23)
BUN/CREAT SERPL: 27.3 (ref 7–25)
CALCIUM SERPL-MCNC: 9.4 MG/DL (ref 8.6–10.5)
CHLORIDE SERPL-SCNC: 99 MMOL/L (ref 98–107)
CHOLEST SERPL-MCNC: 224 MG/DL (ref 0–200)
CHOLEST/HDLC SERPL: 2.46 {RATIO}
CO2 SERPL-SCNC: 27.6 MMOL/L (ref 22–29)
CREAT SERPL-MCNC: 0.66 MG/DL (ref 0.57–1)
EGFRCR SERPLBLD CKD-EPI 2021: 97.5 ML/MIN/1.73
EOSINOPHIL # BLD AUTO: 0.15 10*3/MM3 (ref 0–0.4)
EOSINOPHIL NFR BLD AUTO: 1.9 % (ref 0.3–6.2)
ERYTHROCYTE [DISTWIDTH] IN BLOOD BY AUTOMATED COUNT: 11.9 % (ref 12.3–15.4)
GLOBULIN SER CALC-MCNC: 2.1 GM/DL
GLUCOSE SERPL-MCNC: 84 MG/DL (ref 65–99)
HCT VFR BLD AUTO: 37.5 % (ref 34–46.6)
HDLC SERPL-MCNC: 91 MG/DL (ref 40–60)
HGB BLD-MCNC: 12.9 G/DL (ref 12–15.9)
IMM GRANULOCYTES # BLD AUTO: 0.02 10*3/MM3 (ref 0–0.05)
IMM GRANULOCYTES NFR BLD AUTO: 0.3 % (ref 0–0.5)
LDLC SERPL CALC-MCNC: 120 MG/DL (ref 0–100)
LYMPHOCYTES # BLD AUTO: 2.38 10*3/MM3 (ref 0.7–3.1)
LYMPHOCYTES NFR BLD AUTO: 30.4 % (ref 19.6–45.3)
MCH RBC QN AUTO: 30.6 PG (ref 26.6–33)
MCHC RBC AUTO-ENTMCNC: 34.4 G/DL (ref 31.5–35.7)
MCV RBC AUTO: 88.9 FL (ref 79–97)
MONOCYTES # BLD AUTO: 0.65 10*3/MM3 (ref 0.1–0.9)
MONOCYTES NFR BLD AUTO: 8.3 % (ref 5–12)
NEUTROPHILS # BLD AUTO: 4.59 10*3/MM3 (ref 1.7–7)
NEUTROPHILS NFR BLD AUTO: 58.6 % (ref 42.7–76)
NRBC BLD AUTO-RTO: 0 /100 WBC (ref 0–0.2)
PLATELET # BLD AUTO: 230 10*3/MM3 (ref 140–450)
POTASSIUM SERPL-SCNC: 4 MMOL/L (ref 3.5–5.2)
PROT SERPL-MCNC: 6.8 G/DL (ref 6–8.5)
RBC # BLD AUTO: 4.22 10*6/MM3 (ref 3.77–5.28)
SODIUM SERPL-SCNC: 139 MMOL/L (ref 136–145)
TRIGL SERPL-MCNC: 77 MG/DL (ref 0–150)
VLDLC SERPL CALC-MCNC: 13 MG/DL (ref 5–40)
WBC # BLD AUTO: 7.83 10*3/MM3 (ref 3.4–10.8)

## 2022-10-25 RX ORDER — ROSUVASTATIN CALCIUM 5 MG/1
TABLET, COATED ORAL
Qty: 30 TABLET | Refills: 0 | Status: SHIPPED | OUTPATIENT
Start: 2022-10-25 | End: 2022-12-12

## 2022-12-12 RX ORDER — ROSUVASTATIN CALCIUM 5 MG/1
TABLET, COATED ORAL
Qty: 90 TABLET | Refills: 2 | Status: SHIPPED | OUTPATIENT
Start: 2022-12-12

## 2023-02-21 ENCOUNTER — TELEPHONE (OUTPATIENT)
Dept: CARDIOLOGY | Facility: CLINIC | Age: 68
End: 2023-02-21

## 2023-02-21 NOTE — TELEPHONE ENCOUNTER
Caller: Sonia Basilio    Relationship to patient: Self    Best call back number: [phone]    Chief complaint:CLINIC NEEDS TO RESCHEDULE 04.04.23 APPOINTMENT WITH DR. OREILLY. PATIENT HAS RETURNED CALL TO RESCHEDULE APPOINTMENT.       Type of visit: 1 YR F/U    Requested date: ANY TIME AFTER 3 PM.     If rescheduling, when is the original appointment:04.04.23 @ 1:40PM     Additional notes:

## 2023-03-08 ENCOUNTER — APPOINTMENT (OUTPATIENT)
Dept: GENERAL RADIOLOGY | Facility: HOSPITAL | Age: 68
End: 2023-03-08
Payer: COMMERCIAL

## 2023-03-08 ENCOUNTER — HOSPITAL ENCOUNTER (EMERGENCY)
Facility: HOSPITAL | Age: 68
Discharge: HOME | End: 2023-03-08
Attending: EMERGENCY MEDICINE | Admitting: EMERGENCY MEDICINE
Payer: COMMERCIAL

## 2023-03-08 ENCOUNTER — TELEPHONE (OUTPATIENT)
Dept: INTERNAL MEDICINE | Facility: CLINIC | Age: 68
End: 2023-03-08
Payer: COMMERCIAL

## 2023-03-08 VITALS
OXYGEN SATURATION: 99 % | RESPIRATION RATE: 18 BRPM | BODY MASS INDEX: 21.97 KG/M2 | WEIGHT: 140 LBS | HEIGHT: 67 IN | TEMPERATURE: 97.9 F | SYSTOLIC BLOOD PRESSURE: 141 MMHG | HEART RATE: 69 BPM | DIASTOLIC BLOOD PRESSURE: 84 MMHG

## 2023-03-08 DIAGNOSIS — R06.09 DYSPNEA ON EXERTION: Primary | ICD-10-CM

## 2023-03-08 DIAGNOSIS — R07.9 EXERTIONAL CHEST PAIN: ICD-10-CM

## 2023-03-08 LAB
ALBUMIN SERPL-MCNC: 4.8 G/DL (ref 3.5–5.2)
ALBUMIN/GLOB SERPL: 2.1 G/DL
ALP SERPL-CCNC: 72 U/L (ref 39–117)
ALT SERPL W P-5'-P-CCNC: 20 U/L (ref 1–33)
ANION GAP SERPL CALCULATED.3IONS-SCNC: 9.7 MMOL/L (ref 5–15)
AST SERPL-CCNC: 23 U/L (ref 1–32)
BASOPHILS # BLD AUTO: 0.03 10*3/MM3 (ref 0–0.2)
BASOPHILS NFR BLD AUTO: 0.4 % (ref 0–1.5)
BILIRUB SERPL-MCNC: 0.5 MG/DL (ref 0–1.2)
BUN SERPL-MCNC: 14 MG/DL (ref 8–23)
BUN/CREAT SERPL: 17.7 (ref 7–25)
CALCIUM SPEC-SCNC: 9.8 MG/DL (ref 8.6–10.5)
CHLORIDE SERPL-SCNC: 107 MMOL/L (ref 98–107)
CO2 SERPL-SCNC: 28.3 MMOL/L (ref 22–29)
CREAT SERPL-MCNC: 0.79 MG/DL (ref 0.57–1)
DEPRECATED RDW RBC AUTO: 43.2 FL (ref 37–54)
EGFRCR SERPLBLD CKD-EPI 2021: 83.1 ML/MIN/1.73
EOSINOPHIL # BLD AUTO: 0.22 10*3/MM3 (ref 0–0.4)
EOSINOPHIL NFR BLD AUTO: 2.9 % (ref 0.3–6.2)
ERYTHROCYTE [DISTWIDTH] IN BLOOD BY AUTOMATED COUNT: 12.4 % (ref 12.3–15.4)
GEN 5 2HR TROPONIN T REFLEX: <6 NG/L
GLOBULIN UR ELPH-MCNC: 2.3 GM/DL
GLUCOSE SERPL-MCNC: 95 MG/DL (ref 65–99)
HCT VFR BLD AUTO: 43.5 % (ref 34–46.6)
HGB BLD-MCNC: 14.1 G/DL (ref 12–15.9)
HOLD SPECIMEN: NORMAL
HOLD SPECIMEN: NORMAL
IMM GRANULOCYTES # BLD AUTO: 0.01 10*3/MM3 (ref 0–0.05)
IMM GRANULOCYTES NFR BLD AUTO: 0.1 % (ref 0–0.5)
LYMPHOCYTES # BLD AUTO: 2.28 10*3/MM3 (ref 0.7–3.1)
LYMPHOCYTES NFR BLD AUTO: 29.9 % (ref 19.6–45.3)
MCH RBC QN AUTO: 30.2 PG (ref 26.6–33)
MCHC RBC AUTO-ENTMCNC: 32.4 G/DL (ref 31.5–35.7)
MCV RBC AUTO: 93.1 FL (ref 79–97)
MONOCYTES # BLD AUTO: 0.59 10*3/MM3 (ref 0.1–0.9)
MONOCYTES NFR BLD AUTO: 7.7 % (ref 5–12)
NEUTROPHILS NFR BLD AUTO: 4.5 10*3/MM3 (ref 1.7–7)
NEUTROPHILS NFR BLD AUTO: 59 % (ref 42.7–76)
NRBC BLD AUTO-RTO: 0 /100 WBC (ref 0–0.2)
PLATELET # BLD AUTO: 216 10*3/MM3 (ref 140–450)
PMV BLD AUTO: 10.3 FL (ref 6–12)
POTASSIUM SERPL-SCNC: 4.1 MMOL/L (ref 3.5–5.2)
PROT SERPL-MCNC: 7.1 G/DL (ref 6–8.5)
QT INTERVAL: 401 MS
RBC # BLD AUTO: 4.67 10*6/MM3 (ref 3.77–5.28)
SODIUM SERPL-SCNC: 145 MMOL/L (ref 136–145)
TROPONIN T DELTA: NORMAL
TROPONIN T SERPL HS-MCNC: <6 NG/L
WBC NRBC COR # BLD: 7.63 10*3/MM3 (ref 3.4–10.8)
WHOLE BLOOD HOLD COAG: NORMAL
WHOLE BLOOD HOLD SPECIMEN: NORMAL

## 2023-03-08 PROCEDURE — 84484 ASSAY OF TROPONIN QUANT: CPT

## 2023-03-08 PROCEDURE — 93005 ELECTROCARDIOGRAM TRACING: CPT

## 2023-03-08 PROCEDURE — 84484 ASSAY OF TROPONIN QUANT: CPT | Performed by: EMERGENCY MEDICINE

## 2023-03-08 PROCEDURE — 93010 ELECTROCARDIOGRAM REPORT: CPT | Performed by: INTERNAL MEDICINE

## 2023-03-08 PROCEDURE — 85025 COMPLETE CBC W/AUTO DIFF WBC: CPT

## 2023-03-08 PROCEDURE — 80053 COMPREHEN METABOLIC PANEL: CPT

## 2023-03-08 PROCEDURE — 36415 COLL VENOUS BLD VENIPUNCTURE: CPT

## 2023-03-08 PROCEDURE — 71046 X-RAY EXAM CHEST 2 VIEWS: CPT

## 2023-03-08 PROCEDURE — 99284 EMERGENCY DEPT VISIT MOD MDM: CPT

## 2023-03-08 RX ORDER — SODIUM CHLORIDE 0.9 % (FLUSH) 0.9 %
10 SYRINGE (ML) INJECTION AS NEEDED
Status: DISCONTINUED | OUTPATIENT
Start: 2023-03-08 | End: 2023-03-08 | Stop reason: HOSPADM

## 2023-03-08 RX ORDER — ASPIRIN 325 MG
325 TABLET ORAL ONCE
Status: COMPLETED | OUTPATIENT
Start: 2023-03-08 | End: 2023-03-08

## 2023-03-08 RX ORDER — RANOLAZINE 500 MG/1
500 TABLET, EXTENDED RELEASE ORAL 2 TIMES DAILY
Qty: 60 TABLET | Refills: 1 | Status: SHIPPED | OUTPATIENT
Start: 2023-03-08 | End: 2023-04-07

## 2023-03-08 RX ADMIN — ASPIRIN 325 MG: 325 TABLET ORAL at 12:47

## 2023-03-08 NOTE — TELEPHONE ENCOUNTER
Pt c/o chest pain, down right arm with walking, but worsening & more frequency. Pt informed to go to ER to get evaluation. Pt verbalized understanding.

## 2023-03-08 NOTE — ED TRIAGE NOTES
Pt to ed from home via PV.    Pt c/o intermittent CP and SOB. PT States she's had this for months but last night it worsened. Pt denies CP in triage.

## 2023-03-08 NOTE — ED PROVIDER NOTES
EMERGENCY DEPARTMENT ENCOUNTER    Room Number:  24/24  Date seen:  3/8/2023  PCP: Edward Maloney MD  Historian: Patient      HPI:  Chief Complaint: Chest pain  A complete HPI/ROS/PMH/PSH/SH/FH are unobtainable due to: Nothing  Context: Sonia Basilio is a 67 y.o. female who presents to the ED c/o chest pain and dyspnea that occur with exertion.  She has been having these symptoms for over a year.  She underwent a diagnostic heart cath with Dr. Jane last February that showed no significant coronary disease.  Patient reports that she was started on Crestor at the time but she has continued to had the symptoms.  She reports that recently the symptoms seem to be getting worse.  She also had some mild lightheadedness recently which prompted her visit today.  She has a 1 year follow-up appointment Dr. Jane but it got rescheduled to the end of April.  She denies leg pain or leg swelling.  No history of DVT or PE.            PAST MEDICAL HISTORY  Active Ambulatory Problems     Diagnosis Date Noted   • MVP (mitral valve prolapse) 08/12/2016   • H/O degenerative disc disease 08/12/2016   • Osteopenia 08/12/2016   • Vitamin D deficiency 08/12/2016   • Malignant melanoma of left upper extremity including shoulder (Formerly [facility]) 10/15/2020   • Unstable angina (Formerly [facility]) 02/23/2022   • Precordial pain 03/29/2022     Resolved Ambulatory Problems     Diagnosis Date Noted   • No Resolved Ambulatory Problems     Past Medical History:   Diagnosis Date   • Allergic rhinitis    • Medication management    • Physical exam, annual    • Sciatica          PAST SURGICAL HISTORY  Past Surgical History:   Procedure Laterality Date   • BUNIONECTOMY Right 06/2015   • CARDIAC CATHETERIZATION N/A 2/24/2022    Procedure: Left Heart Cath;  Surgeon: Matt Jane MD;  Location: [facility] INVASIVE LOCATION;  Service: Cardiology;  Laterality: N/A;   • CARDIAC CATHETERIZATION N/A 2/24/2022    Procedure: Coronary angiography;  Surgeon: Matt Jane  MD;  Location: [facility] CATH INVASIVE LOCATION;  Service: Cardiology;  Laterality: N/A;   • CARDIAC CATHETERIZATION N/A 2/24/2022    Procedure: Left ventriculography;  Surgeon: Matt Jane MD;  Location: [facility] CATH INVASIVE LOCATION;  Service: Cardiology;  Laterality: N/A;   • COLONOSCOPY N/A 4/3/2018    Procedure: COLONOSCOPY WITH POLYPECTOMY (HOT SNARE);  Surgeon: Naina Pandya MD;  Location: [facility] ENDOSCOPY;  Service: Gastroenterology   • DILATATION AND CURETTAGE     • SKIN CANCER EXCISION Left     ARM         FAMILY HISTORY  Family History   Problem Relation Age of Onset   • Stroke Mother    • Dementia Mother    • Hypertension Father    • Atrial fibrillation Sister    • Hyperlipidemia Sister    • Heart disease Sister    • Alcohol abuse Son    • Colon cancer Paternal Grandmother    • Heart attack Paternal Grandmother          SOCIAL HISTORY  Social History     Socioeconomic History   • Marital status:    Tobacco Use   • Smoking status: Never   • Smokeless tobacco: Never   Substance and Sexual Activity   • Alcohol use: Yes     Comment: OCCASIONALLY   • Drug use: No   • Sexual activity: Defer         ALLERGIES  Sulfa antibiotics        REVIEW OF SYSTEMS  Review of Systems   Review of all 14 systems is negative other than stated in the HPI above.      PHYSICAL EXAM  ED Triage Vitals   Temp Heart Rate Resp BP SpO2   03/08/23 1139 03/08/23 1139 03/08/23 1141 03/08/23 1144 03/08/23 1139   97.9 °F (36.6 °C) 90 18 178/97 97 %      Temp src Heart Rate Source Patient Position BP Location FiO2 (%)   03/08/23 1139 03/08/23 1139 -- -- --   Tympanic Monitor          Physical Exam      GENERAL: Awake and alert, no acute distress  HENT: nares patent  EYES: no scleral icterus, EOMI  CV: regular rhythm, normal rate, no murmur  RESPIRATORY: normal effort  ABDOMEN: soft, nondistended, nontender throughout  MUSCULOSKELETAL: no deformity  NEURO: alert, moves all extremities, follows commands, cranial nerves II through  XII grossly intact, speech fluent and clear  PSYCH:  calm, cooperative  SKIN: warm, dry    Vital signs and nursing notes reviewed.          LAB RESULTS  Recent Results (from the past 24 hour(s))   ECG 12 Lead Chest Pain    Collection Time: 03/08/23 11:44 AM   Result Value Ref Range    QT Interval 401 ms   Comprehensive Metabolic Panel    Collection Time: 03/08/23 11:50 AM    Specimen: Blood   Result Value Ref Range    Glucose 95 65 - 99 mg/dL    BUN 14 8 - 23 mg/dL    Creatinine 0.79 0.57 - 1.00 mg/dL    Sodium 145 136 - 145 mmol/L    Potassium 4.1 3.5 - 5.2 mmol/L    Chloride 107 98 - 107 mmol/L    CO2 28.3 22.0 - 29.0 mmol/L    Calcium 9.8 8.6 - 10.5 mg/dL    Total Protein 7.1 6.0 - 8.5 g/dL    Albumin 4.8 3.5 - 5.2 g/dL    ALT (SGPT) 20 1 - 33 U/L    AST (SGOT) 23 1 - 32 U/L    Alkaline Phosphatase 72 39 - 117 U/L    Total Bilirubin 0.5 0.0 - 1.2 mg/dL    Globulin 2.3 gm/dL    A/G Ratio 2.1 g/dL    BUN/Creatinine Ratio 17.7 7.0 - 25.0    Anion Gap 9.7 5.0 - 15.0 mmol/L    eGFR 83.1 >60.0 mL/min/1.73   High Sensitivity Troponin T    Collection Time: 03/08/23 11:50 AM    Specimen: Blood   Result Value Ref Range    HS Troponin T <6 <10 ng/L   Green Top (Gel)    Collection Time: 03/08/23 11:50 AM   Result Value Ref Range    Extra Tube Hold for add-ons.    Lavender Top    Collection Time: 03/08/23 11:50 AM   Result Value Ref Range    Extra Tube hold for add-on    Gold Top - SST    Collection Time: 03/08/23 11:50 AM   Result Value Ref Range    Extra Tube Hold for add-ons.    Light Blue Top    Collection Time: 03/08/23 11:50 AM   Result Value Ref Range    Extra Tube Hold for add-ons.    CBC Auto Differential    Collection Time: 03/08/23 11:50 AM    Specimen: Blood   Result Value Ref Range    WBC 7.63 3.40 - 10.80 10*3/mm3    RBC 4.67 3.77 - 5.28 10*6/mm3    Hemoglobin 14.1 12.0 - 15.9 g/dL    Hematocrit 43.5 34.0 - 46.6 %    MCV 93.1 79.0 - 97.0 fL    MCH 30.2 26.6 - 33.0 pg    MCHC 32.4 31.5 - 35.7 g/dL    RDW 12.4  12.3 - 15.4 %    RDW-SD 43.2 37.0 - 54.0 fl    MPV 10.3 6.0 - 12.0 fL    Platelets 216 140 - 450 10*3/mm3    Neutrophil % 59.0 42.7 - 76.0 %    Lymphocyte % 29.9 19.6 - 45.3 %    Monocyte % 7.7 5.0 - 12.0 %    Eosinophil % 2.9 0.3 - 6.2 %    Basophil % 0.4 0.0 - 1.5 %    Immature Grans % 0.1 0.0 - 0.5 %    Neutrophils, Absolute 4.50 1.70 - 7.00 10*3/mm3    Lymphocytes, Absolute 2.28 0.70 - 3.10 10*3/mm3    Monocytes, Absolute 0.59 0.10 - 0.90 10*3/mm3    Eosinophils, Absolute 0.22 0.00 - 0.40 10*3/mm3    Basophils, Absolute 0.03 0.00 - 0.20 10*3/mm3    Immature Grans, Absolute 0.01 0.00 - 0.05 10*3/mm3    nRBC 0.0 0.0 - 0.2 /100 WBC   High Sensitivity Troponin T 2Hr    Collection Time: 03/08/23  2:10 PM    Specimen: Hand, Right; Blood   Result Value Ref Range    HS Troponin T <6 <10 ng/L    Troponin T Delta         Ordered the above labs and reviewed the results.        RADIOLOGY  XR Chest 2 View    Result Date: 3/8/2023  Chest radiograph  HISTORY:Chest pain  TECHNIQUE: Two PA and lateral radiographs  COMPARISON:None      FINDINGS AND IMPRESSION: No pulmonary consolidation or pneumothorax is seen. Cardiac silhouette is within normal limits for size. No pleural effusion is present.  This report was finalized on 3/8/2023 12:07 PM by Dr. Mikal Brock M.D.        Ordered the above noted radiological studies. Reviewed by me in PACS.            PROCEDURES  Procedures              MEDICATIONS GIVEN IN ER  Medications   aspirin tablet 325 mg (325 mg Oral Given 3/8/23 1247)                   MEDICAL DECISION MAKING, PROGRESS, and CONSULTS    All labs have been independently reviewed by me.  All radiology studies have been reviewed by me and I have also reviewed the radiology report.   EKG's independently viewed and interpreted by me.  Discussion below represents my analysis of pertinent findings related to patient's condition, differential diagnosis, treatment plan and final disposition.      Additional sources:  -  Discussed/ obtained information from independent historians: N/A    - External (non-ED) record review: Prior cath report reviewed and summarized below            Orders placed during this visit:  Orders Placed This Encounter   Procedures   • XR Chest 2 View   • Huggins Draw   • Comprehensive Metabolic Panel   • High Sensitivity Troponin T   • CBC Auto Differential   • High Sensitivity Troponin T 2Hr   • Undress and Gown   • Continuous Pulse Oximetry   • ECG 12 Lead Chest Pain   • CBC & Differential   • Green Top (Gel)   • Lavender Top   • Gold Top - SST   • Light Blue Top           Differential diagnosis:    Stable angina  Acute coronary syndrome  Pneumonia  Pulmonary embolus        Independent interpretation of labs, radiology studies, and discussions with consultants:  ED Course as of 03/08/23 1937   Wed Mar 08, 2023   1319 HS Troponin T: <6 [JR]   1319 Chest x-ray independently interpreted in PACS demonstrates no infiltrate, no pneumothorax. [JR]   1320 EKG independently interpreted by me:  Time: 11:44 AM  Rate and rhythm: Sinus rhythm, 65  RI: Normal  QRS: Normal axis  ST and T waves: Borderline T wave changes in V1 [JR]   1343 HS Troponin T: <6 [JR]   1353 Record review: I reviewed cardiac cath from 2/24/2022.  Patient had no significant coronary obstructive disease.  They plan to treat her for small vessel coronary disease with a statin and nitrate. [JR]   1355 Record review: Telephone encounter from 3/3/2022 reveals that patient was complaining of headache which was attributed to the Imdur prescribed therefore it was discontinued. [JR]   1418 Discussed with Dr. Jane, cardiologist.  I explained that patient was having persistent chest pain and dyspnea on exertion had a reassuring work-up today.  Also mentioned her previous intolerance to Imdur and suggested a trial of Ranexa.  He felt this was reasonable.  He recommended initiating Ranexa 500 mg twice daily and he will see her in follow-up at her April  appointment. [JR]   1524 HS Troponin T: <6 [JR]      ED Course User Index  [JR] Viktor Hernandez MD             I wore an N95 mask, face shield, and gloves during this patient encounter.  Patient also wearing a surgical mask.  Hand hygeine performed before and after seeing the patient.    DIAGNOSIS  Final diagnoses:   Dyspnea on exertion   Exertional chest pain         DISPOSITION  DISCHARGE    Patient discharged in stable condition.    Reviewed implications of results, diagnosis, meds, responsibility to follow up, warning signs and symptoms of possible worsening, potential complications and reasons to return to ER.    Patient/Family voiced understanding of above instructions.    Discussed plan for discharge, as there is no emergent indication for admission. Patient referred to primary care provider for BP management due to today's BP. Pt/family is agreeable and understands need for follow up and repeat testing.  Pt is aware that discharge does not mean that nothing is wrong but it indicates no emergency is present that requires admission and they must continue care with follow-up as given below or physician of their choice.     FOLLOW-UP  Edward Maloney MD  [address] [facility] [address]  [place] [address]  [phone]    Schedule an appointment as soon as possible for a visit       Matt Jane MD  [address] [facility] [address]  [place] [address]  [phone]    Schedule an appointment as soon as possible for a visit            Medication List      New Prescriptions    ranolazine 500 MG 12 hr tablet  Commonly known as: Ranexa  Take 1 tablet by mouth 2 (Two) Times a Day for 30 days.           Where to Get Your Medications      These medications were sent to [facility] [address] [place] [address] [facility] - [phone]  - [phone]   [id number] [place] [address]    Phone: [phone]   · ranolazine 500 MG 12 hr tablet                   Latest Documented Vital Signs:  As  of 19:37 EST  BP- 141/84 HR- 69 Temp- 97.9 °F (36.6 °C) (Tympanic) O2 sat- 99%              --    Please note that portions of this were completed with a voice recognition program.       Note Disclaimer: At [facility], we believe that sharing information builds trust and better relationships. You are receiving this note because you are receiving care at [facility] or recently visited. It is possible you will see health information before a provider has talked with you about it. This kind of information can be easy to misunderstand. To help you fully understand what it means for your health, we urge you to discuss this note with your provider.           Viktor Hernandez MD  03/08/23 8052

## 2023-03-14 ENCOUNTER — OFFICE VISIT (OUTPATIENT)
Dept: INTERNAL MEDICINE | Facility: CLINIC | Age: 68
End: 2023-03-14
Payer: COMMERCIAL

## 2023-03-14 VITALS
DIASTOLIC BLOOD PRESSURE: 72 MMHG | TEMPERATURE: 98 F | HEIGHT: 67 IN | RESPIRATION RATE: 18 BRPM | WEIGHT: 140 LBS | SYSTOLIC BLOOD PRESSURE: 118 MMHG | OXYGEN SATURATION: 98 % | HEART RATE: 70 BPM | BODY MASS INDEX: 21.97 KG/M2

## 2023-03-14 DIAGNOSIS — R07.2 PRECORDIAL PAIN: Primary | ICD-10-CM

## 2023-03-14 PROCEDURE — 99213 OFFICE O/P EST LOW 20 MIN: CPT | Performed by: INTERNAL MEDICINE

## 2023-03-14 RX ORDER — HYDROXYZINE PAMOATE 25 MG/1
25 CAPSULE ORAL 2 TIMES DAILY
Qty: 30 CAPSULE | Refills: 3 | Status: SHIPPED | OUTPATIENT
Start: 2023-03-14

## 2023-03-14 NOTE — PROGRESS NOTES
Subjective   Sonia Basilio is a 67 y.o. female.     Chief Complaint   Patient presents with   • Hospital Follow Up Visit     Chest Pain            History of Present Illness  In with a tightness in the upper chest that occurs with walking.  It occurs early in a walk at about 3 minutes or so.  She can stop and rest the symptoms will resolve and then she can walk 45 minutes without a bit of difficulty.  She has some associated shortness of breath.  Occasional sweatiness.  Radiates into the neck area and jaw area.  With the symptoms she had a cardiac catheterization 1 year ago that was unremarkable other than 10% RCA.  Dr. Jane gave her some Imdur in case this was small vessel disease but she had profound headaches on the Imdur and could not tolerate it.  She was seen in the emergency room 6 days ago and started on raloxifene 500 mg twice daily for the symptoms.  Evaluation emergency room was perfectly normal.  She went to the emergency room because she was having some rest symptoms which was the first time she has had rest symptoms.  She was having pressure in the anterior chest going to the left arm and jaw.  No shortness of breath.  No nausea or vomiting.  Some sweatiness.  Lightheadedness.       The following portions of the patient's history were reviewed and updated as appropriate: allergies, current medications, past social history and problem list.    Outpatient Medications Marked as Taking for the 3/14/23 encounter (Office Visit) with Edward Maloney MD   Medication Sig Dispense Refill   • Cholecalciferol (VITAMIN D3) 5000 units capsule capsule Take 1 capsule by mouth Daily.     • Estradiol (Estrogel) 0.75 MG/1.25 GM (0.06%) topical gel Place 1.25 g on the skin as directed by provider Daily.     • Multiple Vitamins-Minerals (MULTIVITAMIN & MINERAL PO) Take 1 tablet by mouth Daily.     • Progesterone (PROMETRIUM) 200 MG capsule TAKE 1 CAPSULE BY MOUTH ONCE DAILY ON DAYS 1-12 OF EACH MONTH     •  ranolazine (Ranexa) 500 MG 12 hr tablet Take 1 tablet by mouth 2 (Two) Times a Day for 30 days. 60 tablet 1   • rosuvastatin (CRESTOR) 5 MG tablet Take 1 tablet by mouth once daily 90 tablet 2   • vitamin C (ASCORBIC ACID) 250 MG tablet Take 1 tablet by mouth Daily.     • Zinc 50 MG capsule Take 50 mg by mouth Daily.         Review of Systems   Respiratory: Negative for shortness of breath and wheezing.    Cardiovascular: Negative for chest pain, palpitations and leg swelling.       Objective   Vitals:    03/14/23 1438   BP: 118/72   Pulse: 70   Resp: 18   Temp: 98 °F (36.7 °C)   SpO2: 98%      Wt Readings from Last 3 Encounters:   03/14/23 63.5 kg (140 lb)   03/08/23 63.5 kg (140 lb)   11/22/22 63.5 kg (140 lb)    Body mass index is 21.93 kg/m².      Physical Exam  Constitutional:       Appearance: Normal appearance.   Cardiovascular:      Rate and Rhythm: Normal rate and regular rhythm.      Heart sounds: No murmur heard.    No gallop.   Pulmonary:      Effort: Pulmonary effort is normal. No respiratory distress.      Breath sounds: No wheezing or rales.   Neurological:      Mental Status: She is alert.           Problems Addressed this Visit        Cardiac and Vasculature    Precordial pain - Primary   Diagnoses       Codes Comments    Precordial pain    -  Primary ICD-10-CM: R07.2  ICD-9-CM: 786.51         Assessment & Plan   In today with recurrent chest pain over the last year.  Started around January 2022.  Fairly classic exertional symptoms relieved by rest but she can then go out and walk for 45 minutes afterwards without a bit of difficulty.  That is an unusual pattern.  Not typical walk-through angina.  She had a cardiac catheterization in February 2022 which showed a 10% stenosis in the right coronary artery was otherwise normal.  She was seen in the emergency room 6 days ago with breast symptoms and had a negative evaluation.  Started on raloxifene at that time.  She has not really had a great response  to the raloxifene.  Not having too many rest symptoms this week but still has the stress symptoms.  She has tried Zoloft in the past for anxiety but did not like how it made her feel.  Felt like she was too flat.  She has also tried a Proventil inhaler for the symptoms and that has not helped.  I suspect anxiety is still the best diagnosis.  And small vessel disease in her heart.  Dr. Jane will be seeing her in about 1 month.  She worries that she has 2 sisters with atrial fibrillation and both parents  from coronary disease.  Both were smokers.  She actually thinks she is feeling better since she is got a looser bra.  I think we will try an anxiety approach today and add some Vistaril and see if that helps her symptoms.  Prevnar 20 updated today.  Her annual exam is 2023.    The above information was reviewed again today 23.  It continues to be accurate as reflected above and is unchanged.  History, physical and review of systems all reviewed and are unchanged.  Medications were reviewed today and continue the current dosing.    PPE today includes face mask and eye shield.           Dragon disclaimer:   Part of this note may be an electronic transcription/translation of spoken language to printed text using the Dragon Dictation System.

## 2023-04-28 ENCOUNTER — OFFICE VISIT (OUTPATIENT)
Dept: CARDIOLOGY | Facility: CLINIC | Age: 68
End: 2023-04-28
Payer: COMMERCIAL

## 2023-04-28 VITALS
HEART RATE: 64 BPM | HEIGHT: 67 IN | WEIGHT: 142.6 LBS | BODY MASS INDEX: 22.38 KG/M2 | SYSTOLIC BLOOD PRESSURE: 108 MMHG | DIASTOLIC BLOOD PRESSURE: 68 MMHG

## 2023-04-28 DIAGNOSIS — R07.2 PRECORDIAL PAIN: ICD-10-CM

## 2023-04-28 DIAGNOSIS — I34.1 MVP (MITRAL VALVE PROLAPSE): Primary | ICD-10-CM

## 2023-04-28 PROCEDURE — 99214 OFFICE O/P EST MOD 30 MIN: CPT | Performed by: INTERNAL MEDICINE

## 2023-04-28 PROCEDURE — 93000 ELECTROCARDIOGRAM COMPLETE: CPT | Performed by: INTERNAL MEDICINE

## 2023-04-28 RX ORDER — RANOLAZINE 500 MG/1
500 TABLET, EXTENDED RELEASE ORAL DAILY
Qty: 90 TABLET | Refills: 3 | Status: SHIPPED | OUTPATIENT
Start: 2023-04-28 | End: 2023-05-28

## 2023-04-28 NOTE — PROGRESS NOTES
Date of Office Visit: 23  Encounter Provider: Matt Jane MD  Place of Service: [facility] CARDIOLOGY  Patient Name: Sonia Basilio  :1955  [id number]    Chief Complaint   Patient presents with   • Chest Pain   :     HPI: Sonia Basilio is a 68 y.o. female she came in about a month ago with chest discomfort very suggestive of angina and we ended up taken to the Cath Lab she had some luminal irregularities in the right coronary the others were normal LV function was normal I felt like it could be small vessel coronary disease so we started on a little bit of Imdur and unfortunately she was not able to tolerate it because of headaches.  She was in the ER about a month ago and had some chest discomfort I talked with the ER physician and we put her on some Ranexa she feels like that that is helped her since then and otherwise she has been doing pretty well no chest pain at rest no shortness of breath no PND orthopnea edema    Past Medical History:   Diagnosis Date   • Allergic rhinitis    • H/O degenerative disc disease    • Medication management    • MVP (mitral valve prolapse)    • Osteopenia    • Physical exam, annual    • Sciatica    • Vitamin D deficiency        Past Surgical History:   Procedure Laterality Date   • BUNIONECTOMY Right 2015   • CARDIAC CATHETERIZATION N/A 2022    Procedure: Left Heart Cath;  Surgeon: Matt Jane MD;  Location: [facility] CATH INVASIVE LOCATION;  Service: Cardiology;  Laterality: N/A;   • CARDIAC CATHETERIZATION N/A 2022    Procedure: Coronary angiography;  Surgeon: Matt Jane MD;  Location[facility] CATH INVASIVE LOC[facility];  Service: Cardiology;  Laterality: N/A;   • CARDIAC CATHETERIZATION N/A 2022    Procedure: Left ventriculography;  Surgeon: Matt Jane MD;  Location: [facility] CATH INVASIVE LOCATION;  Service: Cardiology;  Laterality: N/A;   • COLONOSCOPY N/A 4/3/2018    Procedure: COLONOSCOPY  WITH POLYPECTOMY (HOT SNARE);  Surgeon: Naina Pandya MD;  Location: [facility] ENDOSCOPY;  Service: Gastroenterology   • DILATATION AND CURETTAGE     • SKIN CANCER EXCISION Left     ARM       Social History     Socioeconomic History   • Marital status:    Tobacco Use   • Smoking status: Never   • Smokeless tobacco: Never   Substance and Sexual Activity   • Alcohol use: Yes     Comment: OCCASIONALLY   • Drug use: No   • Sexual activity: Defer       Family History   Problem Relation Age of Onset   • Stroke Mother    • Dementia Mother    • Hypertension Father    • Atrial fibrillation Sister    • Hyperlipidemia Sister    • Heart disease Sister    • Alcohol abuse Son    • Colon cancer Paternal Grandmother    • Heart attack Paternal Grandmother        Review of Systems   Constitutional: Negative for decreased appetite, fever, malaise/fatigue and weight loss.   HENT: Negative for nosebleeds.    Eyes: Negative for double vision.   Cardiovascular: Negative for chest pain, claudication, cyanosis, dyspnea on exertion, irregular heartbeat, leg swelling, near-syncope, orthopnea, palpitations, paroxysmal nocturnal dyspnea and syncope.   Respiratory: Negative for cough, hemoptysis and shortness of breath.    Hematologic/Lymphatic: Negative for bleeding problem.   Skin: Negative for rash.   Musculoskeletal: Negative for falls and myalgias.   Gastrointestinal: Negative for hematochezia, jaundice, melena, nausea and vomiting.   Genitourinary: Negative for hematuria.   Neurological: Negative for dizziness and seizures.   Psychiatric/Behavioral: Negative for altered mental status and memory loss.       Allergies   Allergen Reactions   • Sulfa Antibiotics Rash         Current Outpatient Medications:   •  Aluminum & Magnesium Hydroxide (MAGNESIUM-ALUMINUM PO), Take  by mouth., Disp: , Rfl:   •  Cholecalciferol (VITAMIN D3) 5000 units capsule capsule, Take 1 capsule by mouth Daily., Disp: , Rfl:   •  Estradiol (Estrogel) 0.75  "MG/1.25 GM (0.06%) topical gel, Place 1.25 g on the skin as directed by provider Daily., Disp: , Rfl:   •  Multiple Vitamins-Minerals (MULTIVITAMIN & MINERAL PO), Take 1 tablet by mouth Daily., Disp: , Rfl:   •  Progesterone (PROMETRIUM) 200 MG capsule, TAKE 1 CAPSULE BY MOUTH ONCE DAILY ON DAYS 1-12 OF EACH MONTH, Disp: , Rfl:   •  ranolazine (Ranexa) 500 MG 12 hr tablet, Take 1 tablet by mouth 2 (Two) Times a Day for 30 days., Disp: 60 tablet, Rfl: 1  •  rosuvastatin (CRESTOR) 5 MG tablet, Take 1 tablet by mouth once daily, Disp: 90 tablet, Rfl: 2  •  vitamin C (ASCORBIC ACID) 250 MG tablet, Take 1 tablet by mouth Daily., Disp: , Rfl:   •  hydrOXYzine pamoate (Vistaril) 25 MG capsule, Take 1 capsule by mouth 2 (Two) Times a Day. (Patient not taking: Reported on 4/28/2023), Disp: 30 capsule, Rfl: 3  •  Zinc 50 MG capsule, Take 50 mg by mouth Daily. (Patient not taking: Reported on 4/28/2023), Disp: , Rfl:       Objective:     Vitals:    04/28/23 1402   BP: 108/68   Pulse: 64   Weight: 64.7 kg (142 lb 9.6 oz)   Height: 170.2 cm (67\")     Body mass index is 22.33 kg/m².    Constitutional:       Appearance: Well-developed.   Eyes:      General: No scleral icterus.  HENT:      Head: Normocephalic.   Neck:      Thyroid: No thyromegaly.      Vascular: No JVD.      Lymphadenopathy: No cervical adenopathy.   Pulmonary:      Effort: Pulmonary effort is normal.      Breath sounds: Normal breath sounds. No wheezing. No rales.   Cardiovascular:      Normal rate. Regular rhythm.      No gallop.   Edema:     Peripheral edema absent.   Abdominal:      Palpations: Abdomen is soft.      Tenderness: There is no abdominal tenderness.   Musculoskeletal: Normal range of motion. Skin:     General: Skin is warm and dry.      Findings: No rash.   Neurological:      Mental Status: Alert and oriented to person, place, and time.           ECG 12 Lead    Date/Time: 4/28/2023 2:22 PM  Performed by: Matt Jane MD  Authorized by: Buck, " MD Matt   Comparison: compared with previous ECG   Similar to previous ECG  Rhythm: sinus rhythm    Clinical impression: normal ECG             Assessment:       Diagnosis Plan   1. MVP (mitral valve prolapse)               Plan:       Well she has had some chest pain that seems consistent with angina but she does not have a lot of risk factors for it and her cath you know did not show much in the way of epicardial disease we tried nitrates on her but she got bad headaches and could not tolerate it and now she is on Ranexa just 1 time a day and seems to be doing well with that I Shawna keep her on the Ranexa I will have her come back and see Latrice in a year and then see me in 2  No follow-ups on file.     As always, it has been a pleasure to participate in your patient's care.      Sincerely,       Matt Jane MD

## 2023-08-24 ENCOUNTER — OFFICE VISIT (OUTPATIENT)
Dept: INTERNAL MEDICINE | Facility: CLINIC | Age: 68
End: 2023-08-24
Payer: COMMERCIAL

## 2023-08-24 VITALS
HEIGHT: 67 IN | TEMPERATURE: 98.4 F | BODY MASS INDEX: 22.6 KG/M2 | OXYGEN SATURATION: 99 % | RESPIRATION RATE: 16 BRPM | WEIGHT: 144 LBS | DIASTOLIC BLOOD PRESSURE: 62 MMHG | SYSTOLIC BLOOD PRESSURE: 92 MMHG | HEART RATE: 81 BPM

## 2023-08-24 DIAGNOSIS — M17.10 KNEE ARTHROPATHY: Primary | ICD-10-CM

## 2023-08-24 DIAGNOSIS — Z87.39 H/O DEGENERATIVE DISC DISEASE: ICD-10-CM

## 2023-08-24 PROCEDURE — 99213 OFFICE O/P EST LOW 20 MIN: CPT | Performed by: INTERNAL MEDICINE

## 2023-08-24 RX ORDER — MELOXICAM 15 MG/1
15 TABLET ORAL DAILY
Qty: 30 TABLET | Refills: 2 | Status: SHIPPED | OUTPATIENT
Start: 2023-08-24

## 2023-08-24 NOTE — PROGRESS NOTES
Subjective   Sonia Basilio is a 68 y.o. female.     Chief Complaint   Patient presents with    Knee Pain    Knee Injury     X 1 month ago         History of Present Illness  About 2-1/2 months ago she was working in the garden and climbed over the fence into the garden bed and landed on her left knee.  Did not twisted.  It was soft.  She seemed okay initially.  Since then she has been having pain on and off of the left knee.  It comes and goes.  She was seen in [facility] center a month ago and given a round of Medrol which did help for a while.  Sometimes knee swells a bit.  No warmth or redness.  Knee Pain   The incident occurred more than 1 week ago. The incident occurred at home. The injury mechanism was a direct blow.      The following portions of the patient's history were reviewed and updated as appropriate: allergies, current medications, past social history and problem list.    Outpatient Medications Marked as Taking for the 8/24/23 encounter (Office Visit) with Edward Maloney MD   Medication Sig Dispense Refill    Aluminum & Magnesium Hydroxide (MAGNESIUM-ALUMINUM PO) Take  by mouth.      Cholecalciferol (VITAMIN D3) 5000 units capsule capsule Take 1 capsule by mouth Daily.      Estradiol (Estrogel) 0.75 MG/1.25 GM (0.06%) topical gel Place 1.25 g on the skin as directed by provider Daily.      hydrOXYzine pamoate (Vistaril) 25 MG capsule Take 1 capsule by mouth 2 (Two) Times a Day. 30 capsule 3    Multiple Vitamins-Minerals (MULTIVITAMIN & MINERAL PO) Take 1 tablet by mouth Daily.      Progesterone (PROMETRIUM) 200 MG capsule TAKE 1 CAPSULE BY MOUTH ONCE DAILY ON DAYS 1-12 OF EACH MONTH      rosuvastatin (CRESTOR) 5 MG tablet Take 1 tablet by mouth once daily 90 tablet 2    vitamin C (ASCORBIC ACID) 250 MG tablet Take 1 tablet by mouth Daily.      Zinc 50 MG capsule Take 50 mg by mouth Daily.      [DISCONTINUED] methylPREDNISolone (MEDROL) 4 MG dose pack Take as directed on package  instructions. 21 each 0       Review of Systems   Constitutional:  Negative for chills and fever.   Musculoskeletal:  Positive for arthralgias and joint swelling.     Objective   Vitals:    08/24/23 1010   BP: 92/62   Pulse: 81   Resp: 16   Temp: 98.4 øF (36.9 øC)   SpO2: 99%      Wt Readings from Last 3 Encounters:   08/24/23 65.3 kg (144 lb)   07/04/23 63.5 kg (140 lb)   04/28/23 64.7 kg (142 lb 9.6 oz)    Body mass index is 22.55 kg/mý.      Physical Exam  Constitutional:       Appearance: Normal appearance.   Musculoskeletal:         General: No swelling, tenderness or deformity.      Right lower leg: No edema.      Left lower leg: No edema.   Neurological:      Mental Status: She is alert.         Problems Addressed this Visit          Musculoskeletal and Injuries    H/O degenerative disc disease     Other Visit Diagnoses       Knee arthropathy    -  Primary          Diagnoses         Codes Comments    Knee arthropathy    -  Primary ICD-10-CM: M17.10  ICD-9-CM: 716.96     H/O degenerative disc disease     ICD-10-CM: Z87.39  ICD-9-CM: V13.59           Assessment & Plan   In with left knee pain for 2 to 3 months.  It started with a direct blow.  She does have some underlying mild arthritis in that knee on plain films.  Also some chondrocalcinosis.  She did respond for a while to a round of Medrol.  The issue is whether she has some arthritis or pseudogout that is been stirred up from the direct blow.  The intermittent resolution of her symptoms would be a point against a cartilage tear.  We will begin meloxicam 15 mg daily for 30 days and see how she responds.  She been having flareup of bilateral sciatica in the past several days and that should improve with the meloxicam as well.    The above information was reviewed again today 08/24/23.  It continues to be accurate as reflected above and is unchanged.  History, physical and review of systems all reviewed and are unchanged.  Medications were reviewed today and  continue the current dosing.               Dragon disclaimer:   Part of this note may be an electronic transcription/translation of spoken language to printed text using the Dragon Dictation System.

## 2023-10-23 RX ORDER — ROSUVASTATIN CALCIUM 5 MG/1
TABLET, COATED ORAL
Qty: 90 TABLET | Refills: 0 | Status: SHIPPED | OUTPATIENT
Start: 2023-10-23

## 2023-10-24 ENCOUNTER — OFFICE VISIT (OUTPATIENT)
Dept: INTERNAL MEDICINE | Facility: CLINIC | Age: 68
End: 2023-10-24
Payer: COMMERCIAL

## 2023-10-24 ENCOUNTER — LAB (OUTPATIENT)
Dept: LAB | Facility: HOSPITAL | Age: 68
End: 2023-10-24
Payer: COMMERCIAL

## 2023-10-24 VITALS
RESPIRATION RATE: 18 BRPM | HEART RATE: 74 BPM | SYSTOLIC BLOOD PRESSURE: 114 MMHG | BODY MASS INDEX: 22.76 KG/M2 | HEIGHT: 67 IN | DIASTOLIC BLOOD PRESSURE: 60 MMHG | TEMPERATURE: 97.9 F | WEIGHT: 145 LBS | OXYGEN SATURATION: 97 %

## 2023-10-24 DIAGNOSIS — Z00.00 ENCOUNTER FOR PREVENTIVE HEALTH EXAMINATION: ICD-10-CM

## 2023-10-24 DIAGNOSIS — Z23 NEED FOR VACCINATION: Primary | ICD-10-CM

## 2023-10-24 DIAGNOSIS — E55.9 VITAMIN D DEFICIENCY: ICD-10-CM

## 2023-10-24 LAB
25(OH)D3 SERPL-MCNC: 37.4 NG/ML (ref 30–100)
ALBUMIN SERPL-MCNC: 4.7 G/DL (ref 3.5–5.2)
ALBUMIN/GLOB SERPL: 2.1 G/DL
ALP SERPL-CCNC: 68 U/L (ref 39–117)
ALT SERPL W P-5'-P-CCNC: 16 U/L (ref 1–33)
ANION GAP SERPL CALCULATED.3IONS-SCNC: 7.8 MMOL/L (ref 5–15)
AST SERPL-CCNC: 21 U/L (ref 1–32)
BASOPHILS # BLD AUTO: 0.03 10*3/MM3 (ref 0–0.2)
BASOPHILS NFR BLD AUTO: 0.4 % (ref 0–1.5)
BILIRUB SERPL-MCNC: 0.2 MG/DL (ref 0–1.2)
BUN SERPL-MCNC: 17 MG/DL (ref 8–23)
BUN/CREAT SERPL: 23.9 (ref 7–25)
CALCIUM SPEC-SCNC: 9.7 MG/DL (ref 8.6–10.5)
CHLORIDE SERPL-SCNC: 105 MMOL/L (ref 98–107)
CHOLEST SERPL-MCNC: 243 MG/DL (ref 0–200)
CO2 SERPL-SCNC: 28.2 MMOL/L (ref 22–29)
CREAT SERPL-MCNC: 0.71 MG/DL (ref 0.57–1)
DEPRECATED RDW RBC AUTO: 40.7 FL (ref 37–54)
EGFRCR SERPLBLD CKD-EPI 2021: 93.9 ML/MIN/1.73
EOSINOPHIL # BLD AUTO: 0.09 10*3/MM3 (ref 0–0.4)
EOSINOPHIL NFR BLD AUTO: 1.3 % (ref 0.3–6.2)
ERYTHROCYTE [DISTWIDTH] IN BLOOD BY AUTOMATED COUNT: 12 % (ref 12.3–15.4)
GLOBULIN UR ELPH-MCNC: 2.2 GM/DL
GLUCOSE SERPL-MCNC: 91 MG/DL (ref 65–99)
HCT VFR BLD AUTO: 40.4 % (ref 34–46.6)
HDLC SERPL QL: 2.89
HDLC SERPL-MCNC: 84 MG/DL (ref 40–60)
HGB BLD-MCNC: 13.5 G/DL (ref 12–15.9)
IMM GRANULOCYTES # BLD AUTO: 0.02 10*3/MM3 (ref 0–0.05)
IMM GRANULOCYTES NFR BLD AUTO: 0.3 % (ref 0–0.5)
LDLC SERPL CALC-MCNC: 147 MG/DL (ref 0–100)
LYMPHOCYTES # BLD AUTO: 2.16 10*3/MM3 (ref 0.7–3.1)
LYMPHOCYTES NFR BLD AUTO: 32 % (ref 19.6–45.3)
MCH RBC QN AUTO: 30.8 PG (ref 26.6–33)
MCHC RBC AUTO-ENTMCNC: 33.4 G/DL (ref 31.5–35.7)
MCV RBC AUTO: 92.2 FL (ref 79–97)
MONOCYTES # BLD AUTO: 0.57 10*3/MM3 (ref 0.1–0.9)
MONOCYTES NFR BLD AUTO: 8.4 % (ref 5–12)
NEUTROPHILS NFR BLD AUTO: 3.88 10*3/MM3 (ref 1.7–7)
NEUTROPHILS NFR BLD AUTO: 57.6 % (ref 42.7–76)
NRBC BLD AUTO-RTO: 0 /100 WBC (ref 0–0.2)
PLATELET # BLD AUTO: 254 10*3/MM3 (ref 140–450)
PMV BLD AUTO: 10.6 FL (ref 6–12)
POTASSIUM SERPL-SCNC: 4.1 MMOL/L (ref 3.5–5.2)
PROT SERPL-MCNC: 6.9 G/DL (ref 6–8.5)
RBC # BLD AUTO: 4.38 10*6/MM3 (ref 3.77–5.28)
SODIUM SERPL-SCNC: 141 MMOL/L (ref 136–145)
TRIGL SERPL-MCNC: 69 MG/DL (ref 0–150)
VLDLC SERPL-MCNC: 12 MG/DL (ref 5–40)
WBC NRBC COR # BLD: 6.75 10*3/MM3 (ref 3.4–10.8)

## 2023-10-24 PROCEDURE — 80053 COMPREHEN METABOLIC PANEL: CPT | Performed by: INTERNAL MEDICINE

## 2023-10-24 PROCEDURE — 85025 COMPLETE CBC W/AUTO DIFF WBC: CPT | Performed by: INTERNAL MEDICINE

## 2023-10-24 PROCEDURE — 80061 LIPID PANEL: CPT | Performed by: INTERNAL MEDICINE

## 2023-10-24 PROCEDURE — 36415 COLL VENOUS BLD VENIPUNCTURE: CPT | Performed by: INTERNAL MEDICINE

## 2023-10-24 PROCEDURE — 82306 VITAMIN D 25 HYDROXY: CPT | Performed by: INTERNAL MEDICINE

## 2023-10-24 NOTE — PROGRESS NOTES
Subjective   Sonia Basilio is a 68 y.o. female.     Chief Complaint   Patient presents with    Annual Exam    Knee Pain         History of Present Illness  In for annual preventative exam.  Overall she feels pretty well.  Sleeps about 6-7 hours per night.  Somewhat interrupted and she is usually up twice.   She exercises 2 day weekly mainly walking.  Energy is could be better.  Periods of fatigue.  Diet is well-balanced.  Knee Pain   The incident occurred more than 1 week ago. There was no injury mechanism. The pain is present in the left knee and right knee. Pertinent negatives include no numbness. She has tried NSAIDs for the symptoms.   Fatigue  This is a chronic problem. The current episode started more than 1 year ago. Associated symptoms include arthralgias (knees) and chest pain. Pertinent negatives include no abdominal pain, chills, coughing, diaphoresis, fatigue, fever, headaches, myalgias, nausea, numbness, vomiting or weakness.        The following portions of the patient's history were reviewed and updated as appropriate: allergies, current medications, past social history and problem list.    HISTORY  Outpatient Medications Marked as Taking for the 10/24/23 encounter (Office Visit) with Edward Maloney MD   Medication Sig Dispense Refill    Aluminum & Magnesium Hydroxide (MAGNESIUM-ALUMINUM PO) Take  by mouth.      Cholecalciferol (VITAMIN D3) 5000 units capsule capsule Take 1 capsule by mouth Daily.      Estradiol (Estrogel) 0.75 MG/1.25 GM (0.06%) topical gel Place 1.25 g on the skin as directed by provider Daily.      hydrOXYzine pamoate (Vistaril) 25 MG capsule Take 1 capsule by mouth 2 (Two) Times a Day. 30 capsule 3    Multiple Vitamins-Minerals (MULTIVITAMIN & MINERAL PO) Take 1 tablet by mouth Daily.      Progesterone (PROMETRIUM) 200 MG capsule TAKE 1 CAPSULE BY MOUTH ONCE DAILY ON DAYS 1-12 OF EACH MONTH      rosuvastatin (CRESTOR) 5 MG tablet Take 1 tablet by mouth once daily 90 tablet  0    vitamin C (ASCORBIC ACID) 250 MG tablet Take 1 tablet by mouth Daily.      Zinc 50 MG capsule Take 50 mg by mouth Daily.       Social History     Socioeconomic History    Marital status:    Tobacco Use    Smoking status: Never     Passive exposure: Never    Smokeless tobacco: Never   Vaping Use    Vaping Use: Never used   Substance and Sexual Activity    Alcohol use: Yes     Comment: OCCASIONALLY    Drug use: No    Sexual activity: Defer     Family History   Problem Relation Age of Onset    Stroke Mother     Dementia Mother     Hypertension Father     Atrial fibrillation Sister     Hyperlipidemia Sister     Heart disease Sister     Alcohol abuse Son     Colon cancer Paternal Grandmother     Heart attack Paternal Grandmother      Past Medical History:   Diagnosis Date    Allergic rhinitis     H/O degenerative disc disease     Medication management     MVP (mitral valve prolapse)     Osteopenia     Physical exam, annual     Sciatica     Vitamin D deficiency      Past Surgical History:   Procedure Laterality Date    BUNIONECTOMY Right 06/2015    CARDIAC CATHETERIZATION N/A 2/24/2022    Procedure: Left Heart Cath;  Surgeon: Matt Jane MD;  Location: [facility] CATH INVASIVE LOCATION;  Service: Cardiology;  Laterality: N/A;    CARDIAC CATHETERIZATION N/A 2/24/2022    Procedure: Coronary angiography;  Surgeon: Matt Jane MD;  Location: [facility] CATH INVASIVE LOCATION;  Service: Cardiology;  Laterality: N/A;    CARDIAC CATHETERIZATION N/A 2/24/2022    Procedure: Left ventriculography;  Surgeon: Matt Jane MD;  Location: [facility] CATH INVASIVE LOCATION;  Service: Cardiology;  Laterality: N/A;    COLONOSCOPY N/A 4/3/2018    Procedure: COLONOSCOPY WITH POLYPECTOMY (HOT SNARE);  Surgeon: Naina Pandya MD;  Location: [facility] ENDOSCOPY;  Service: Gastroenterology    DILATATION AND CURETTAGE      SKIN CANCER EXCISION Left     ARM       Review of Systems   Constitutional:  Negative for appetite change,  chills, diaphoresis, fatigue, fever and unexpected weight change.   Respiratory:  Negative for cough, chest tightness, shortness of breath and wheezing.    Cardiovascular:  Positive for chest pain. Negative for palpitations and leg swelling.   Gastrointestinal:  Negative for abdominal pain, anal bleeding, blood in stool, constipation, diarrhea, nausea, rectal pain and vomiting.   Endocrine: Negative for cold intolerance, heat intolerance and polyuria.   Genitourinary:  Negative for difficulty urinating, dysuria, flank pain, frequency, hematuria and urgency.   Musculoskeletal:  Positive for arthralgias (knees). Negative for back pain and myalgias.   Allergic/Immunologic: Negative for environmental allergies.   Neurological:  Negative for dizziness, syncope, speech difficulty, weakness, light-headedness, numbness and headaches.   Hematological:  Does not bruise/bleed easily.   Psychiatric/Behavioral:  Negative for agitation, confusion, dysphoric mood and sleep disturbance. The patient is not nervous/anxious.        Objective   Vitals:    10/24/23 1530   BP: 114/60   Pulse: 74   Resp: 18   Temp: 97.9 °F (36.6 °C)   SpO2: 97%          10/24/23  1530   Weight: 65.8 kg (145 lb)    [unfilled]  Body mass index is 22.71 kg/m².      Physical Exam   Constitutional: She is oriented to person, place, and time. She appears well-developed.   HENT:   Head: Normocephalic and atraumatic.   Right Ear: Tympanic membrane and external ear normal.   Left Ear: Tympanic membrane and external ear normal.   Nose: Nose normal.   Eyes: Pupils are equal, round, and reactive to light. Conjunctivae are normal.   Neck: No JVD present. No thyromegaly present.   Cardiovascular: Normal rate, regular rhythm and normal heart sounds. Exam reveals no gallop.   No murmur heard.  Pulmonary/Chest: Effort normal and breath sounds normal. No respiratory distress. She has no wheezes. She has no rales.   Abdominal: Soft. Normal appearance and bowel sounds  are normal. She exhibits no distension and no mass. There is no abdominal tenderness. There is no guarding. No hernia.   Musculoskeletal: Normal range of motion.   Lymphadenopathy:     She has no cervical adenopathy.   Neurological: She is alert and oriented to person, place, and time. She displays normal reflexes. No cranial nerve deficit. Coordination normal.   Skin: Skin is warm and dry.   Psychiatric: Her behavior is normal. Mood, judgment and thought content normal.   Nursing note and vitals reviewed.        Problems Addressed this Visit    None  Visit Diagnoses       Need for vaccination    -  Primary    Relevant Orders    Fluzone High-Dose 65+yrs (Completed)    Encounter for preventive health examination              Diagnoses         Codes Comments    Need for vaccination    -  Primary ICD-10-CM: Z23  ICD-9-CM: V05.9     Encounter for preventive health examination     ICD-10-CM: Z00.00  ICD-9-CM: V70.0           Assessment & Plan   In for annual preventive exam today October 2023.  Overall she looks great.  Annual labs today will include CBC, CMP, lipids, vitamin D level, UA.  She exercises regularly mainly with walking.  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  She has had some knee arthralgias that did nicely with meloxicam but the symptoms have now come back 1 month off of meloxicam.  She is going to resume it.  We might also consider half dose of meloxicam 7.5 mg to daily at some point.  Follow-up one year with annual preventative exam.  She is 1 HPP today.  Flu shot updated today.  Prevnar 20 updated today.  Due for TD AP and COVID boosters.  She is due for Shingrix as well.    Prevention counseling was performed today. The counseling performed was routine health maintenance topics including BMI and exercise.    The above information was reviewed again today 10/24/23.  It continues to be accurate as reflected above and is unchanged.  History, physical and review of  systems all reviewed and are unchanged.  Medications were reviewed today and continue the current dosing.         Dragon disclaimer:   Much of this encounter note is an electronic transcription/translation of spoken language to printed text. The electronic translation of spoken language may permit erroneous, or at times, nonsensical words or phrases to be inadvertently transcribed; Although I have reviewed the note for such errors, some may still exist.

## 2023-10-31 ENCOUNTER — PATIENT ROUNDING (BHMG ONLY) (OUTPATIENT)
Dept: INTERNAL MEDICINE | Facility: CLINIC | Age: 68
End: 2023-10-31
Payer: COMMERCIAL

## 2023-10-31 NOTE — PROGRESS NOTES
October 31, 2023    Hello, may I speak with Sonia Basilio?    My name is Yeny    I am  with ALEX CHATTERJEE  [facility]  [address] [place], [place] [address]KENNEDI [address] [address] [phone].    Before we get started may I verify your date of birth? 1955    I am calling to officially welcome you to our practice and ask about your recent visit. Is this a good time to talk? yes    Tell me about your visit with us. What things went well?  My visit went great,k Dr. Chatterjee is excellent!       We're always looking for ways to make our patients' experiences even better. Do you have recommendations on ways we may improve?  no    Overall were you satisfied with your first visit to our practice? yes       I appreciate you taking the time to speak with me today. Is there anything else I can do for you? no      Thank you, and have a great day.

## 2024-01-25 RX ORDER — ROSUVASTATIN CALCIUM 5 MG/1
TABLET, COATED ORAL
Qty: 90 TABLET | Refills: 0 | Status: SHIPPED | OUTPATIENT
Start: 2024-01-25

## 2024-05-29 ENCOUNTER — OFFICE VISIT (OUTPATIENT)
Dept: CARDIOLOGY | Facility: CLINIC | Age: 69
End: 2024-05-29
Payer: COMMERCIAL

## 2024-05-29 VITALS
HEIGHT: 67 IN | WEIGHT: 136 LBS | SYSTOLIC BLOOD PRESSURE: 118 MMHG | HEART RATE: 72 BPM | DIASTOLIC BLOOD PRESSURE: 64 MMHG | BODY MASS INDEX: 21.35 KG/M2

## 2024-05-29 DIAGNOSIS — E78.5 HYPERLIPIDEMIA LDL GOAL <70: ICD-10-CM

## 2024-05-29 DIAGNOSIS — I25.10 CORONARY ARTERY DISEASE INVOLVING NATIVE CORONARY ARTERY OF NATIVE HEART WITHOUT ANGINA PECTORIS: Primary | ICD-10-CM

## 2024-05-29 PROCEDURE — 99214 OFFICE O/P EST MOD 30 MIN: CPT | Performed by: NURSE PRACTITIONER

## 2024-05-29 PROCEDURE — 93000 ELECTROCARDIOGRAM COMPLETE: CPT | Performed by: NURSE PRACTITIONER

## 2024-05-29 RX ORDER — SIMVASTATIN 10 MG
10 TABLET ORAL NIGHTLY
Qty: 30 TABLET | Refills: 2 | Status: SHIPPED | OUTPATIENT
Start: 2024-05-29

## 2024-05-29 NOTE — PROGRESS NOTES
Date of Office Visit: 2024  Encounter Provider: JACQUELINE Srivastava  Place of Service: [facility] CARDIOLOGY  Patient Name: Sonia Basilio  :1955    Chief Complaint   Patient presents with    Coronary Artery Disease   :     HPI: Sonia Basilio is a 69 y.o. female patient of Dr. Jane's whom we have managed medically for small vessel coronary disease.  Cardiac catheterization from  demonstrated mild nonobstructive disease of the RCA.    She was last seen in the office by Dr. Jane in 2023 at which time she was overall doing well.  She had been in the ER about a month prior with chest discomfort and had been started on Ranexa which had reportedly improved her symptoms.  No changes were made to her regimen, and she was advised to follow-up in 1 year.    She has been doing well.  She still experiences exertional chest pains with strenuous activity.  She reports they occur less frequently than before and are not as severe.  She denies any shortness of breath, palpitations, edema, dizziness, or syncope.  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  She is working on weight loss.  Of note, she recently stopped the rosuvastatin secondary to myalgias.    Past Medical History:   Diagnosis Date    Allergic rhinitis     H/O degenerative disc disease     Medication management     MVP (mitral valve prolapse)     Osteopenia     Physical exam, annual     Sciatica     Vitamin D deficiency        Past Surgical History:   Procedure Laterality Date    BUNIONECTOMY Right 2015    CARDIAC CATHETERIZATION N/A 2022    Procedure: Left Heart Cath;  Surgeon: Matt Jane MD;  Location:  [facility] CATH INVASIVE LOCATION;  Service: Cardiology;  Laterality: N/A;    CARDIAC CATHETERIZATION N/A 2022    Procedure: Coronary angiography;  Surgeon: Matt Jane MD;  Location:  [facility]VAS[facility];  Service: Cardiology;  Laterality: N/A;     CARDIAC CATHETERIZATION N/A 2/24/2022    Procedure: Left ventriculography;  Surgeon: Matt Jane MD;  Location: [facility];  Service: Cardiology;  Laterality: N/A;    COLONOSCOPY N/A 4/3/2018    Procedure: COLONOSCOPY WITH POLYPECTOMY (HOT SNARE);  Surgeon: Naina Pandya MD;  Location: [facility];  Service: Gastroenterology    DILATATION AND CURETTAGE      SKIN CANCER EXCISION Left     ARM       Social History     Socioeconomic History    Marital status:    Tobacco Use    Smoking status: Never     Passive exposure: Never    Smokeless tobacco: Never   Vaping Use    Vaping status: Never Used   Substance and Sexual Activity    Alcohol use: Yes     Comment: OCCASIONALLY    Drug use: No    Sexual activity: Defer       Family History   Problem Relation Age of Onset    Stroke Mother     Dementia Mother     Hypertension Father     Atrial fibrillation Sister     Hyperlipidemia Sister     Heart disease Sister     Alcohol abuse Son     Colon cancer Paternal Grandmother     Heart attack Paternal Grandmother        Review of Systems   Constitutional: Negative.   Cardiovascular:  Positive for chest pain. Negative for dyspnea on exertion, leg swelling, orthopnea, paroxysmal nocturnal dyspnea and syncope.   Respiratory: Negative.     Hematologic/Lymphatic: Negative for bleeding problem.   Musculoskeletal:  Negative for falls.   Gastrointestinal:  Negative for melena.   Neurological:  Negative for dizziness and light-headedness.       Allergies   Allergen Reactions    Isosorbide Headache    Sulfa Antibiotics Rash         Current Outpatient Medications:     Cholecalciferol (VITAMIN D3) 5000 units capsule capsule, Take 1 capsule by mouth Daily., Disp: , Rfl:     Estradiol (Estrogel) 0.75 MG/1.25 GM (0.06%) topical gel, Place 1.25 g on the skin as directed by provider Daily., Disp: , Rfl:     meloxicam (Mobic) 15 MG tablet, Take 1 tablet by mouth Daily., Disp: 30 tablet, Rfl: 2    Multiple  "Vitamins-Minerals (MULTIVITAMIN & MINERAL PO), Take 1 tablet by mouth Daily., Disp: , Rfl:     ranolazine (Ranexa) 500 MG 12 hr tablet, Take 1 tablet by mouth Daily for 30 days., Disp: 90 tablet, Rfl: 3    vitamin C (ASCORBIC ACID) 250 MG tablet, Take 1 tablet by mouth Daily., Disp: , Rfl:     simvastatin (ZOCOR) 10 MG tablet, Take 1 tablet by mouth Every Night., Disp: 30 tablet, Rfl: 2      Objective:     Vitals:    05/29/24 0916   BP: 118/64   Pulse: 72   Weight: 61.7 kg (136 lb)   Height: 170.2 cm (67\")     Body mass index is 21.3 kg/m².    PHYSICAL EXAM:    Neck:      Vascular: No JVD.   Pulmonary:      Effort: Pulmonary effort is normal.      Breath sounds: Normal breath sounds.   Cardiovascular:      Normal rate. Regular rhythm.      Murmurs: There is no murmur.      No gallop.  No click. No rub.   Pulses:     Intact distal pulses.           ECG 12 Lead    Date/Time: 5/29/2024 9:24 AM  Performed by: Latrice Amaya APRN    Authorized by: Latrice Amaya APRN  Comparison: compared with previous ECG from 4/28/2023  Similar to previous ECG  Rhythm: sinus rhythm  Rate: normal  BPM: 72            Assessment:       Diagnosis Plan   1. Coronary artery disease involving native coronary artery of native heart without angina pectoris  ECG 12 Lead      2. Hyperlipidemia LDL goal <70          Orders Placed This Encounter   Procedures    ECG 12 Lead     This order was created via procedure documentation     Order Specific Question:   Release to patient     Answer:   Routine Release [[id number]]          Plan:       1.  Coronary artery disease.  Cardiac catheterization from 2022 demonstrated mild nonobstructive disease of the RCA.  We have been treating her medically for small vessel disease with Ranexa.  She stopped the rosuvastatin secondary to myalgias.  As such, I recommended initiating simvastatin instead.      2.  Hyperlipidemia.  Lipid panel from October 2023 demonstrated an LDL of 147 and an HDL of 84.  We " are starting simvastatin today.  She will have a repeat lipid panel and hepatic function panel in 3 months.      I think she is doing well.  She will follow-up with Dr. Jane in 1 year.          As always, it has been a pleasure to participate in your patient's care.      Sincerely,         JACQUELINE Pickens

## 2024-05-31 RX ORDER — RANOLAZINE 500 MG/1
500 TABLET, EXTENDED RELEASE ORAL DAILY
Qty: 30 TABLET | Refills: 0 | Status: SHIPPED | OUTPATIENT
Start: 2024-05-31

## 2024-06-03 ENCOUNTER — TELEPHONE (OUTPATIENT)
Dept: CARDIOLOGY | Facility: CLINIC | Age: 69
End: 2024-06-03
Payer: COMMERCIAL

## 2024-06-03 NOTE — TELEPHONE ENCOUNTER
Discussed the plan of care with Dr. Jane who recommended she begin taking an 81 mg aspirin daily.    I left a voicemail for the patient regarding this information.

## 2024-06-24 RX ORDER — RANOLAZINE 500 MG/1
500 TABLET, EXTENDED RELEASE ORAL DAILY
Qty: 30 TABLET | Refills: 0 | Status: SHIPPED | OUTPATIENT
Start: 2024-06-24

## 2024-06-25 ENCOUNTER — OFFICE VISIT (OUTPATIENT)
Dept: INTERNAL MEDICINE | Facility: CLINIC | Age: 69
End: 2024-06-25
Payer: COMMERCIAL

## 2024-06-25 VITALS
DIASTOLIC BLOOD PRESSURE: 70 MMHG | BODY MASS INDEX: 21.19 KG/M2 | WEIGHT: 135 LBS | HEIGHT: 67 IN | SYSTOLIC BLOOD PRESSURE: 122 MMHG | OXYGEN SATURATION: 98 % | TEMPERATURE: 98 F | RESPIRATION RATE: 16 BRPM | HEART RATE: 80 BPM

## 2024-06-25 DIAGNOSIS — R53.83 OTHER FATIGUE: Primary | ICD-10-CM

## 2024-06-25 PROCEDURE — 99214 OFFICE O/P EST MOD 30 MIN: CPT | Performed by: INTERNAL MEDICINE

## 2024-06-25 RX ORDER — PROGESTERONE 100 MG/1
100 CAPSULE ORAL DAILY
COMMUNITY

## 2024-06-25 NOTE — PROGRESS NOTES
Subjective   Sonia Basilio is a 69 y.o. female.     Chief Complaint   Patient presents with    Hypotension         Hypotension  Associated symptoms include chest pain and fatigue.        The following portions of the patient's history were reviewed and updated as appropriate: allergies, current medications, past social history and problem list.    Outpatient Medications Marked as Taking for the 6/25/24 encounter (Office Visit) with Edward Maloney MD   Medication Sig Dispense Refill    ASPIRIN 81 PO Take  by mouth.      Cholecalciferol (VITAMIN D3) 5000 units capsule capsule Take 1 capsule by mouth Daily.      Estradiol (Estrogel) 0.75 MG/1.25 GM (0.06%) topical gel Place 1.25 g on the skin as directed by provider Daily.      meloxicam (Mobic) 15 MG tablet Take 1 tablet by mouth Daily. 30 tablet 2    Multiple Vitamins-Minerals (MULTIVITAMIN & MINERAL PO) Take 1 tablet by mouth Daily.      Progesterone (PROMETRIUM) 100 MG capsule Take 1 capsule by mouth Daily.      ranolazine (RANEXA) 500 MG 12 hr tablet Take 1 tablet by mouth once daily 30 tablet 0    simvastatin (ZOCOR) 10 MG tablet Take 1 tablet by mouth Every Night. 30 tablet 2    vitamin C (ASCORBIC ACID) 250 MG tablet Take 1 tablet by mouth Daily.         Review of Systems   Constitutional:  Positive for fatigue.   Respiratory:  Negative for shortness of breath.    Cardiovascular:  Positive for chest pain and palpitations. Negative for leg swelling.   Neurological:  Negative for dizziness and light-headedness.       Objective   Vitals:    06/25/24 1258   BP: 122/70   Pulse: 80   Resp: 16   Temp: 98 °F (36.7 °C)   SpO2: 98%      Wt Readings from Last 3 Encounters:   06/25/24 61.2 kg (135 lb)   05/29/24 61.7 kg (136 lb)   10/24/23 65.8 kg (145 lb)    Body mass index is 21.14 kg/m².      Physical Exam  Constitutional:       Appearance: Normal appearance.   Cardiovascular:      Rate and Rhythm: Regular rhythm.      Heart sounds: Normal heart sounds.  No murmur heard.     No gallop.   Pulmonary:      Effort: No respiratory distress.      Breath sounds: Normal breath sounds. No wheezing or rales.   Neurological:      Mental Status: She is alert.           Problems Addressed this Visit    None  Visit Diagnoses       Other fatigue    -  Primary          Diagnoses         Codes Comments    Other fatigue    -  Primary ICD-10-CM: R53.83  ICD-9-CM: 780.79           Assessment & Plan   Having spells of fatigue in the mid morning and late afternoon.  This has been going on for a few years.  It feels like she hits the wall and almost has to lay down at times.  Had 1 low blood pressure at the dental office of 84/58.  2 other blood pressures before and since then were normal in the 111-120 range.  He is on no antihypertensives.  She often skips breakfast.  When she eats breakfast is usually at 8 AM.  Her morning Sariah usually occurs about 10 AM.  She eats lunch at 11 AM and the little usually occurs at about 4 PM.  It is possible that we are dealing with reactive hypoglycemia rather than hypotension today.  She needs to eat more frequent and smaller meals with a midmorning and midafternoon snack.  If that does not solve the problem we may have to do a formal glucose tolerance test.  She has been having a Coke in the early afternoon and that may be part of the problem.  This needs to be more like a protein shake or some peanut butter and crackers.    The above information was reviewed again today 06/25/24.  It continues to be accurate as reflected above and is unchanged.  History, physical and review of systems all reviewed and are unchanged.  Medications were reviewed today and continue the current dosing.               Dragon disclaimer:   Part of this note may be an electronic transcription/translation of spoken language to printed text using the Dragon Dictation System.

## 2024-07-22 RX ORDER — RANOLAZINE 500 MG/1
500 TABLET, EXTENDED RELEASE ORAL DAILY
Qty: 30 TABLET | Refills: 0 | Status: SHIPPED | OUTPATIENT
Start: 2024-07-22

## 2024-08-19 RX ORDER — SIMVASTATIN 10 MG
10 TABLET ORAL NIGHTLY
Qty: 90 TABLET | Refills: 0 | Status: SHIPPED | OUTPATIENT
Start: 2024-08-19

## 2024-08-19 RX ORDER — RANOLAZINE 500 MG/1
500 TABLET, EXTENDED RELEASE ORAL DAILY
Qty: 30 TABLET | Refills: 0 | Status: SHIPPED | OUTPATIENT
Start: 2024-08-19

## 2024-08-29 ENCOUNTER — TELEPHONE (OUTPATIENT)
Dept: CARDIOLOGY | Facility: CLINIC | Age: 69
End: 2024-08-29
Payer: COMMERCIAL

## 2024-08-29 DIAGNOSIS — E78.5 HYPERLIPIDEMIA LDL GOAL <70: Primary | ICD-10-CM

## 2024-08-29 DIAGNOSIS — I25.10 CORONARY ARTERY DISEASE INVOLVING NATIVE CORONARY ARTERY OF NATIVE HEART WITHOUT ANGINA PECTORIS: ICD-10-CM

## 2024-08-29 NOTE — TELEPHONE ENCOUNTER
Called and left a VM. Will continue to try to reach pt.    Loni Shultz, RN  Triage [facility]  08/29/24 11:24 EDT

## 2024-08-30 NOTE — TELEPHONE ENCOUNTER
Notified pt. She verbalized understanding.    Thank you,    Loni Shultz, RN  Triage [facility] [place]  08/30/24 14:25 EDT

## 2024-08-30 NOTE — TELEPHONE ENCOUNTER
Latrice,    I called and spoke with patient. She is in the middle of switching to Medicare. She would like to get established first and then come in to have her labs drawn. She asked if she could get them drawn mid-September.    Thank you,    Loni Shultz RN  Triage [facility] [place]  08/30/24 14:09 EDT

## 2024-09-23 RX ORDER — RANOLAZINE 500 MG/1
500 TABLET, EXTENDED RELEASE ORAL DAILY
Qty: 90 TABLET | Refills: 3 | Status: SHIPPED | OUTPATIENT
Start: 2024-09-23

## 2024-09-23 RX ORDER — RANOLAZINE 500 MG/1
500 TABLET, EXTENDED RELEASE ORAL DAILY
Qty: 30 TABLET | Refills: 0 | OUTPATIENT
Start: 2024-09-23

## 2024-09-24 ENCOUNTER — LAB (OUTPATIENT)
Dept: LAB | Facility: HOSPITAL | Age: 69
End: 2024-09-24
Payer: MEDICARE

## 2024-09-24 DIAGNOSIS — E78.5 HYPERLIPIDEMIA LDL GOAL <70: ICD-10-CM

## 2024-09-24 DIAGNOSIS — I25.10 CORONARY ARTERY DISEASE INVOLVING NATIVE CORONARY ARTERY OF NATIVE HEART WITHOUT ANGINA PECTORIS: ICD-10-CM

## 2024-09-24 LAB
ALBUMIN SERPL-MCNC: 4.4 G/DL (ref 3.5–5.2)
ALP SERPL-CCNC: 73 U/L (ref 39–117)
ALT SERPL W P-5'-P-CCNC: 15 U/L (ref 1–33)
AST SERPL-CCNC: 21 U/L (ref 1–32)
BILIRUB CONJ SERPL-MCNC: <0.2 MG/DL (ref 0–0.3)
BILIRUB INDIRECT SERPL-MCNC: NORMAL MG/DL
BILIRUB SERPL-MCNC: 0.3 MG/DL (ref 0–1.2)
CHOLEST SERPL-MCNC: 230 MG/DL (ref 0–200)
HDLC SERPL-MCNC: 79 MG/DL (ref 40–60)
LDLC SERPL CALC-MCNC: 139 MG/DL (ref 0–100)
LDLC/HDLC SERPL: 1.73 {RATIO}
PROT SERPL-MCNC: 6.7 G/DL (ref 6–8.5)
TRIGL SERPL-MCNC: 72 MG/DL (ref 0–150)
VLDLC SERPL-MCNC: 12 MG/DL (ref 5–40)

## 2024-09-24 PROCEDURE — 80076 HEPATIC FUNCTION PANEL: CPT

## 2024-09-24 PROCEDURE — 36415 COLL VENOUS BLD VENIPUNCTURE: CPT

## 2024-09-24 PROCEDURE — 80061 LIPID PANEL: CPT

## 2024-10-28 ENCOUNTER — OFFICE VISIT (OUTPATIENT)
Dept: INTERNAL MEDICINE | Facility: CLINIC | Age: 69
End: 2024-10-28
Payer: MEDICARE

## 2024-10-28 VITALS
SYSTOLIC BLOOD PRESSURE: 118 MMHG | OXYGEN SATURATION: 98 % | TEMPERATURE: 97.8 F | WEIGHT: 138 LBS | HEIGHT: 67 IN | BODY MASS INDEX: 21.66 KG/M2 | DIASTOLIC BLOOD PRESSURE: 62 MMHG | RESPIRATION RATE: 16 BRPM | HEART RATE: 86 BPM

## 2024-10-28 DIAGNOSIS — E78.00 PURE HYPERCHOLESTEROLEMIA: ICD-10-CM

## 2024-10-28 DIAGNOSIS — Z23 NEED FOR VACCINATION: ICD-10-CM

## 2024-10-28 DIAGNOSIS — Z00.00 ENCOUNTER FOR PREVENTIVE HEALTH EXAMINATION: Primary | ICD-10-CM

## 2024-10-28 PROCEDURE — 1159F MED LIST DOCD IN RCRD: CPT | Performed by: INTERNAL MEDICINE

## 2024-10-28 PROCEDURE — 99397 PER PM REEVAL EST PAT 65+ YR: CPT | Performed by: INTERNAL MEDICINE

## 2024-10-28 PROCEDURE — G0008 ADMIN INFLUENZA VIRUS VAC: HCPCS | Performed by: INTERNAL MEDICINE

## 2024-10-28 PROCEDURE — 1160F RVW MEDS BY RX/DR IN RCRD: CPT | Performed by: INTERNAL MEDICINE

## 2024-10-28 PROCEDURE — 90662 IIV NO PRSV INCREASED AG IM: CPT | Performed by: INTERNAL MEDICINE

## 2024-10-28 PROCEDURE — 1125F AMNT PAIN NOTED PAIN PRSNT: CPT | Performed by: INTERNAL MEDICINE

## 2024-10-28 RX ORDER — PRAVASTATIN SODIUM 10 MG
10 TABLET ORAL DAILY
Qty: 90 TABLET | Refills: 1 | Status: SHIPPED | OUTPATIENT
Start: 2024-10-28

## 2024-10-28 RX ORDER — ALBUTEROL SULFATE 90 UG/1
2 INHALANT RESPIRATORY (INHALATION) EVERY 4 HOURS PRN
Qty: 18 G | Refills: 2 | Status: SHIPPED | OUTPATIENT
Start: 2024-10-28

## 2024-10-28 NOTE — PROGRESS NOTES
Subjective   Sonia Basilio is a 69 y.o. female.     Chief Complaint   Patient presents with    Annual Exam    Hyperlipidemia    Vitamin D Deficiency         History of Present Illness  In for annual preventative exam.  Overall she feels pretty well.  Sleeps about 7 hours per night.  Somewhat interrupted and she is usually up twice.   She exercises 5 days weekly mainly walking.  Energy is could be better.  Periods of fatigue.  Diet is well-balanced.  Hyperlipidemia  This is a chronic problem. Associated symptoms include chest pain. Pertinent negatives include no myalgias or shortness of breath.        The following portions of the patient's history were reviewed and updated as appropriate: allergies, current medications, past social history and problem list.    HISTORY  Outpatient Medications Marked as Taking for the 10/28/24 encounter (Office Visit) with Edward Maloney MD   Medication Sig Dispense Refill    ASPIRIN 81 PO Take  by mouth.      Cholecalciferol (VITAMIN D3) 5000 units capsule capsule Take 1 capsule by mouth Daily.      meloxicam (Mobic) 15 MG tablet Take 1 tablet by mouth Daily. 30 tablet 2    Multiple Vitamins-Minerals (MULTIVITAMIN & MINERAL PO) Take 1 tablet by mouth Daily.      ranolazine (RANEXA) 500 MG 12 hr tablet Take 1 tablet by mouth Daily. 90 tablet 3    simvastatin (ZOCOR) 10 MG tablet TAKE 1 TABLET BY MOUTH ONCE DAILY AT NIGHT 90 tablet 0    vitamin C (ASCORBIC ACID) 250 MG tablet Take 1 tablet by mouth Daily.       Social History     Socioeconomic History    Marital status:    Tobacco Use    Smoking status: Never     Passive exposure: Never    Smokeless tobacco: Never   Vaping Use    Vaping status: Never Used   Substance and Sexual Activity    Alcohol use: Yes     Comment: OCCASIONALLY    Drug use: No    Sexual activity: Defer     Family History   Problem Relation Age of Onset    Stroke Mother     Dementia Mother     Hypertension Father     Atrial fibrillation Sister      Hyperlipidemia Sister     Heart disease Sister     Alcohol abuse Son     Colon cancer Paternal Grandmother     Heart attack Paternal Grandmother      Past Medical History:   Diagnosis Date    Allergic rhinitis     H/O degenerative disc disease     Medication management     MVP (mitral valve prolapse)     Osteopenia     Physical exam, annual     Sciatica     Vitamin D deficiency      Past Surgical History:   Procedure Laterality Date    BUNIONECTOMY Right 06/2015    CARDIAC CATHETERIZATION N/A 2/24/2022    Procedure: Left Heart Cath;  Surgeon: Matt Jane MD;  Location: [facility] CATH INVASIVE LOCATION;  Service: Cardiology;  Laterality: N/A;    CARDIAC CATHETERIZATION N/A 2/24/2022    Procedure: Coronary angiography;  Surgeon: Matt Jane MD;  Location: [facility] CATH INVASIVE LOCATION;  Service: Cardiology;  Laterality: N/A;    CARDIAC CATHETERIZATION N/A 2/24/2022    Procedure: Left ventriculography;  Surgeon: Matt Jane MD;  Location: [facility] CATH INVASIVE LOCATION;  Service: Cardiology;  Laterality: N/A;    COLONOSCOPY N/A 4/3/2018    Procedure: COLONOSCOPY WITH POLYPECTOMY (HOT SNARE);  Surgeon: Naina Pandya MD;  Location: [facility] ENDOSCOPY;  Service: Gastroenterology    DILATATION AND CURETTAGE      SKIN CANCER EXCISION Left     ARM       Review of Systems   Constitutional:  Negative for appetite change, chills, diaphoresis, fatigue, fever and unexpected weight change.   Respiratory:  Negative for cough, chest tightness, shortness of breath and wheezing.    Cardiovascular:  Positive for chest pain. Negative for palpitations and leg swelling.   Gastrointestinal:  Negative for abdominal pain, anal bleeding, blood in stool, constipation, diarrhea, nausea, rectal pain and vomiting.   Endocrine: Negative for cold intolerance, heat intolerance and polyuria.   Genitourinary:  Negative for difficulty urinating, dysuria, flank pain, frequency, hematuria and urgency.   Musculoskeletal:  Positive for  arthralgias (knees and hands). Negative for back pain and myalgias.   Allergic/Immunologic: Positive for environmental allergies.   Neurological:  Negative for dizziness, syncope, speech difficulty, weakness, light-headedness, numbness and headaches.   Hematological:  Does not bruise/bleed easily.   Psychiatric/Behavioral:  Negative for agitation, confusion, dysphoric mood and sleep disturbance. The patient is not nervous/anxious.        Objective   Vitals:    10/28/24 1437   BP: 118/62   Pulse: 86   Resp: 16   Temp: 97.8 °F (36.6 °C)   SpO2: 98%          10/28/24  1437   Weight: 62.6 kg (138 lb)    [unfilled]  Body mass index is 21.61 kg/m².      Physical Exam   Constitutional: She is oriented to person, place, and time. She appears well-developed.   HENT:   Head: Normocephalic and atraumatic.   Right Ear: Tympanic membrane and external ear normal.   Left Ear: Tympanic membrane and external ear normal.   Nose: Nose normal.   Eyes: Pupils are equal, round, and reactive to light. Conjunctivae are normal.   Neck: No JVD present. No thyromegaly present.   Cardiovascular: Normal rate, regular rhythm and normal heart sounds. Exam reveals no gallop.   No murmur heard.  Pulmonary/Chest: Effort normal and breath sounds normal. No respiratory distress.   Abdominal: Soft. Normal appearance and bowel sounds are normal. She exhibits no distension and no mass. There is no abdominal tenderness. There is no guarding. No hernia.   Musculoskeletal: Normal range of motion.   Lymphadenopathy:     She has no cervical adenopathy.   Neurological: She is alert and oriented to person, place, and time. She displays normal reflexes. No cranial nerve deficit. Coordination normal.   Skin: Skin is warm and dry.   Psychiatric: Her behavior is normal. Mood, judgment and thought content normal.   Nursing note and vitals reviewed.        Problems Addressed this Visit    None  Visit Diagnoses       Encounter for preventive health examination     -  Primary    Need for vaccination        Relevant Orders    Fluzone High-Dose 65+yrs (Completed)          Diagnoses         Codes Comments    Encounter for preventive health examination    -  Primary ICD-10-CM: Z00.00  ICD-9-CM: V70.0     Need for vaccination     ICD-10-CM: Z23  ICD-9-CM: V05.9           Assessment & Plan   In for annual preventive exam today October 2024.  Overall she looks great.  Annual labs today will include CBC, CMP, lipids, vitamin D level, UA.  She exercises regularly mainly with walking.  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  She has had some knee arthralgias that did nicely with meloxicam but the symptoms have now come back.  Follow-up one year with annual preventative exam.  She is 1 HPP today.  Flu shot updated today.  Due for COVID boosters.  She is due for Shingrix and Tdap as well.  She was placed on rosuvastatin but had side effects of arthralgias and myalgias.  She was switched to simvastatin which was better.  Still has a little achiness on it.  Will try on pravastatin 10 mg daily for now and see how she does.  She has dyspnea on exertion but a near normal heart catheterization.  It sounds like her symptoms may be more pulmonary than cardiac.  She has tried an inhaler once with some benefit.  She will try it again.  Recheck lipid profile and side effects of pravastatin in 3 months.    Prevention counseling was performed today. The counseling performed was routine health maintenance topics including BMI and exercise.    The above information was reviewed again today 10/28/24.  It continues to be accurate as reflected above and is unchanged.  History, physical and review of systems all reviewed and are unchanged.  Medications were reviewed today and continue the current dosing.    The 10-year ASCVD risk score (Margie JUAREZ, et al., 2019) is: 5.6%    Values used to calculate the score:      Age: 69 years      Sex: Female      Is Non-   American: No      Diabetic: No      Tobacco smoker: No      Systolic Blood Pressure: 118 mmHg      Is BP treated: No      HDL Cholesterol: 79 mg/dL      Total Cholesterol: 230 mg/dL             Aicha disclaimer:   Much of this encounter note is an electronic transcription/translation of spoken language to printed text. The electronic translation of spoken language may permit erroneous, or at times, nonsensical words or phrases to be inadvertently transcribed; Although I have reviewed the note for such errors, some may still exist.

## 2024-11-11 ENCOUNTER — PATIENT ROUNDING (BHMG ONLY) (OUTPATIENT)
Dept: INTERNAL MEDICINE | Facility: CLINIC | Age: 69
End: 2024-11-11
Payer: MEDICARE

## 2024-11-11 NOTE — PROGRESS NOTES
November 11, 2024    Hello, may I speak with Sonia Basilio?    My name is Mariya Barron      I am  with [facility]  [address] [place] [id number] [phone].    Before we get started may I verify your date of birth? 1955    I am calling to officially welcome you to our practice and ask about your recent visit. Is this a good time to talk? yes    Tell me about your visit with us. What things went well?  was all ok       We're always looking for ways to make our patients' experiences even better. Do you have recommendations on ways we may improve?  no    Overall were you satisfied with your first visit to our practice? yes       I appreciate you taking the time to speak with me today. Is there anything else I can do for you? no      Thank you, and have a great day.

## 2025-01-23 ENCOUNTER — LAB (OUTPATIENT)
Dept: LAB | Facility: HOSPITAL | Age: 70
End: 2025-01-23
Payer: MEDICARE

## 2025-01-23 LAB
CHOLEST SERPL-MCNC: 253 MG/DL (ref 0–200)
HDLC SERPL QL: 3.05
HDLC SERPL-MCNC: 83 MG/DL (ref 40–60)
LDLC SERPL CALC-MCNC: 158 MG/DL (ref 0–100)
TRIGL SERPL-MCNC: 71 MG/DL (ref 0–150)
VLDLC SERPL-MCNC: 12 MG/DL (ref 5–40)

## 2025-01-23 PROCEDURE — 80061 LIPID PANEL: CPT | Performed by: INTERNAL MEDICINE

## 2025-01-28 ENCOUNTER — OFFICE VISIT (OUTPATIENT)
Dept: INTERNAL MEDICINE | Facility: CLINIC | Age: 70
End: 2025-01-28
Payer: MEDICARE

## 2025-01-28 VITALS
TEMPERATURE: 97.5 F | OXYGEN SATURATION: 97 % | HEIGHT: 67 IN | RESPIRATION RATE: 16 BRPM | DIASTOLIC BLOOD PRESSURE: 60 MMHG | WEIGHT: 140 LBS | SYSTOLIC BLOOD PRESSURE: 110 MMHG | BODY MASS INDEX: 21.97 KG/M2 | HEART RATE: 73 BPM

## 2025-01-28 DIAGNOSIS — E78.5 HYPERLIPIDEMIA LDL GOAL <70: Primary | ICD-10-CM

## 2025-01-28 DIAGNOSIS — Z00.00 ENCOUNTER FOR ANNUAL WELLNESS EXAM IN MEDICARE PATIENT: ICD-10-CM

## 2025-01-28 DIAGNOSIS — I25.10 CORONARY ARTERY DISEASE INVOLVING NATIVE CORONARY ARTERY OF NATIVE HEART WITHOUT ANGINA PECTORIS: ICD-10-CM

## 2025-01-28 PROCEDURE — 1160F RVW MEDS BY RX/DR IN RCRD: CPT | Performed by: INTERNAL MEDICINE

## 2025-01-28 PROCEDURE — G2211 COMPLEX E/M VISIT ADD ON: HCPCS | Performed by: INTERNAL MEDICINE

## 2025-01-28 PROCEDURE — G0439 PPPS, SUBSEQ VISIT: HCPCS | Performed by: INTERNAL MEDICINE

## 2025-01-28 PROCEDURE — 1125F AMNT PAIN NOTED PAIN PRSNT: CPT | Performed by: INTERNAL MEDICINE

## 2025-01-28 PROCEDURE — 1159F MED LIST DOCD IN RCRD: CPT | Performed by: INTERNAL MEDICINE

## 2025-01-28 PROCEDURE — 99214 OFFICE O/P EST MOD 30 MIN: CPT | Performed by: INTERNAL MEDICINE

## 2025-01-28 PROCEDURE — 1170F FXNL STATUS ASSESSED: CPT | Performed by: INTERNAL MEDICINE

## 2025-01-28 RX ORDER — PRAVASTATIN SODIUM 20 MG
20 TABLET ORAL DAILY
Qty: 90 TABLET | Refills: 3 | Status: SHIPPED | OUTPATIENT
Start: 2025-01-28

## 2025-01-28 NOTE — PROGRESS NOTES
Subjective   The ABCs of the Annual Wellness Visit  Medicare Wellness Visit      Sonia Basilio is a 69 y.o. patient who presents for a Medicare Wellness Visit.    The following portions of the patient's history were reviewed and   updated as appropriate: allergies, current medications, past family history, past medical history, past social history, past surgical history, and problem list.    Compared to one year ago, the patient's physical   health is the same.  Compared to one year ago, the patient's mental   health is the same.    Recent Hospitalizations:  She was not admitted to the hospital during the last year.     Current Medical Providers:  Patient Care Team:  Edward Maloney MD as PCP - General (Internal Medicine)  Edward Maloney MD as PCP - Internal Medicine (Internal Medicine)  Juliette Chang MD as Consulting Physician (Obstetrics and Gynecology)    Outpatient Medications Prior to Visit   Medication Sig Dispense Refill    albuterol sulfate  (90 Base) MCG/ACT inhaler Inhale 2 puffs Every 4 (Four) Hours As Needed for Wheezing. 18 g 2    ASPIRIN 81 PO Take  by mouth.      Cholecalciferol (VITAMIN D3) 5000 units capsule capsule Take 1 capsule by mouth Daily.      meloxicam (Mobic) 15 MG tablet Take 1 tablet by mouth Daily. 30 tablet 2    Multiple Vitamins-Minerals (MULTIVITAMIN & MINERAL PO) Take 1 tablet by mouth Daily.      Omega-3 Fatty Acids (FISH OIL PO) Take  by mouth.      pravastatin (PRAVACHOL) 10 MG tablet Take 1 tablet by mouth Daily. 90 tablet 1    ranolazine (RANEXA) 500 MG 12 hr tablet Take 1 tablet by mouth Daily. 90 tablet 3    vitamin C (ASCORBIC ACID) 250 MG tablet Take 1 tablet by mouth Daily.       No facility-administered medications prior to visit.     No opioid medication identified on active medication list. I have reviewed chart for other potential  high risk medication/s and harmful drug interactions in the elderly.      Aspirin is on active medication list.  "Aspirin use is indicated based on review of current medical condition/s. Pros and cons of this therapy have been discussed today. Benefits of this medication outweigh potential harm.  Patient has been encouraged to continue taking this medication.  .      Patient Active Problem List   Diagnosis    MVP (mitral valve prolapse)    H/O degenerative disc disease    Osteopenia    Vitamin D deficiency    Malignant melanoma of left upper extremity including shoulder    Unstable angina    Coronary artery disease involving native coronary artery of native heart without angina pectoris    Hyperlipidemia LDL goal <70     Advance Care Planning Advance Directive is not on file.  ACP discussion was held with the patient during this visit. Patient has an advance directive (not in EMR), copy requested.            Objective   Vitals:    01/28/25 0827   BP: 110/60   Pulse: 73   Resp: 16   Temp: 97.5 °F (36.4 °C)   TempSrc: Temporal   SpO2: 97%   Weight: 63.5 kg (140 lb)   Height: 170.2 cm (67\")   PainSc:   1       Estimated body mass index is 21.93 kg/m² as calculated from the following:    Height as of this encounter: 170.2 cm (67\").    Weight as of this encounter: 63.5 kg (140 lb).    BMI is within normal parameters. No other follow-up for BMI required.           Does the patient have evidence of cognitive impairment? No  Lab Results   Component Value Date    TRIG 71 01/23/2025    HDL 83 (H) 01/23/2025     (H) 01/23/2025    VLDL 12 01/23/2025                                                                                               Health  Risk Assessment    Smoking Status:  Social History     Tobacco Use   Smoking Status Never    Passive exposure: Never   Smokeless Tobacco Never     Alcohol Consumption:  Social History     Substance and Sexual Activity   Alcohol Use Yes    Comment: OCCASIONALLY       Fall Risk Screen  STEADI Fall Risk Assessment was completed, and patient is at LOW risk for falls.Assessment completed " on:2025    Depression Screening   Little interest or pleasure in doing things? Not at all   Feeling down, depressed, or hopeless? Not at all   PHQ-2 Total Score 0      Health Habits and Functional and Cognitive Screenin/28/2025     8:27 AM   Functional & Cognitive Status   Do you have difficulty preparing food and eating? No   Do you have difficulty bathing yourself, getting dressed or grooming yourself? No   Do you have difficulty using the toilet? No   Do you have difficulty moving around from place to place? No   Do you have trouble with steps or getting out of a bed or a chair? No   Current Diet Well Balanced Diet   Dental Exam Up to date   Eye Exam Up to date   Exercise (times per week) 4 times per week   Current Exercises Include Walking   Do you need help using the phone?  No   Are you deaf or do you have serious difficulty hearing?  No   Do you need help to go to places out of walking distance? No   Do you need help shopping? No   Do you need help preparing meals?  No   Do you need help with housework?  No   Do you need help with laundry? No   Do you need help taking your medications? No   Do you need help managing money? No   Do you ever drive or ride in a car without wearing a seat belt? No   Have you felt unusual stress, anger or loneliness in the last month? No   Who do you live with? Spouse   If you need help, do you have trouble finding someone available to you? No   Have you been bothered in the last four weeks by sexual problems? No   Do you have difficulty concentrating, remembering or making decisions? No           Age-appropriate Screening Schedule:  Refer to the list below for future screening recommendations based on patient's age, sex and/or medical conditions. Orders for these recommended tests are listed in the plan section. The patient has been provided with a written plan.    Health Maintenance List  Health Maintenance   Topic Date Due    ANNUAL WELLNESS VISIT  Never done     ZOSTER VACCINE (2 of 3) 12/01/2017    TDAP/TD VACCINES (3 - Td or Tdap) 08/05/2023    PAP SMEAR  04/01/2025    MAMMOGRAM  05/01/2025    DXA SCAN  05/01/2025    LIPID PANEL  01/23/2026    COLORECTAL CANCER SCREENING  04/03/2028    HEPATITIS C SCREENING  Completed    COVID-19 Vaccine  Completed    INFLUENZA VACCINE  Completed    Pneumococcal Vaccine 65+  Completed                                                                                                                                                CMS Preventative Services Quick Reference  Risk Factors Identified During Encounter  Fall Risk-High or Moderate: Information on Fall Prevention Shared in After Visit Summary  Normal Balance     The above risks/problems have been discussed with the patient.  Pertinent information has been shared with the patient in the After Visit Summary.  An After Visit Summary and PPPS were made available to the patient.    Follow Up:   Next Medicare Wellness visit to be scheduled in 1 year.     Assessment & Plan  Hyperlipidemia LDL goal <70            Coronary artery disease involving native coronary artery of native heart without angina pectoris           Encounter for annual wellness exam in Medicare patient              Follow Up:   No follow-ups on file.

## 2025-01-28 NOTE — PROGRESS NOTES
Subjective   Sonia Basilio is a 69 y.o. female.     Chief Complaint   Patient presents with    Medicare Wellness-subsequent    Hyperlipidemia         History of Present Illness  In for recheck of hyperlipidemia and coronary artery disease.  Minimal CAD.  She still gets some chest pain with exercise.  It occurs with walking.  If she stops and rests it resolves and then she can walk as long as she wants without any problem.  She thinks she is better on her Ranexa.  She is now on pravastatin and tolerating it great.  Had trouble with simvastatin.  Hyperlipidemia  This is a chronic problem. The current episode started more than 1 year ago. Associated symptoms include chest pain. Pertinent negatives include no shortness of breath.        The following portions of the patient's history were reviewed and updated as appropriate: allergies, current medications, past social history and problem list.    Outpatient Medications Marked as Taking for the 1/28/25 encounter (Office Visit) with Edward Maloney MD   Medication Sig Dispense Refill    albuterol sulfate  (90 Base) MCG/ACT inhaler Inhale 2 puffs Every 4 (Four) Hours As Needed for Wheezing. 18 g 2    ASPIRIN 81 PO Take  by mouth.      Cholecalciferol (VITAMIN D3) 5000 units capsule capsule Take 1 capsule by mouth Daily.      meloxicam (Mobic) 15 MG tablet Take 1 tablet by mouth Daily. 30 tablet 2    Multiple Vitamins-Minerals (MULTIVITAMIN & MINERAL PO) Take 1 tablet by mouth Daily.      Omega-3 Fatty Acids (FISH OIL PO) Take  by mouth.      pravastatin (PRAVACHOL) 10 MG tablet Take 1 tablet by mouth Daily. 90 tablet 1    ranolazine (RANEXA) 500 MG 12 hr tablet Take 1 tablet by mouth Daily. 90 tablet 3    vitamin C (ASCORBIC ACID) 250 MG tablet Take 1 tablet by mouth Daily.         Review of Systems   Constitutional:  Negative for chills and fever.   Respiratory:  Positive for chest tightness. Negative for shortness of breath.    Cardiovascular:   Positive for chest pain. Negative for palpitations and leg swelling.       Objective   Vitals:    01/28/25 0827   BP: 110/60   Pulse: 73   Resp: 16   Temp: 97.5 °F (36.4 °C)   SpO2: 97%      Wt Readings from Last 3 Encounters:   01/28/25 63.5 kg (140 lb)   10/28/24 62.6 kg (138 lb)   06/25/24 61.2 kg (135 lb)    Body mass index is 21.93 kg/m².      Physical Exam  Constitutional:       Appearance: Normal appearance. She is well-developed.   Neck:      Thyroid: No thyromegaly.   Cardiovascular:      Rate and Rhythm: Normal rate and regular rhythm.      Heart sounds: Normal heart sounds. No murmur heard.     No gallop.   Pulmonary:      Effort: Pulmonary effort is normal. No respiratory distress.      Breath sounds: Normal breath sounds. No wheezing or rales.   Neurological:      Mental Status: She is alert.           Problems Addressed this Visit          Cardiac and Vasculature    Hyperlipidemia LDL goal <70 - Primary                      Coronary artery disease involving native coronary artery of native heart without angina pectoris                      Other Visit Diagnoses       Encounter for annual wellness exam in Medicare patient              Diagnoses         Codes Comments    Hyperlipidemia LDL goal <70    -  Primary ICD-10-CM: E78.5  ICD-9-CM: 272.4     Coronary artery disease involving native coronary artery of native heart without angina pectoris     ICD-10-CM: I25.10  ICD-9-CM: 414.01     Encounter for annual wellness exam in Medicare patient     ICD-10-CM: Z00.00  ICD-9-CM: V70.0           Assessment & Plan   In for recheck of chest pain and hyperlipidemia.  She has failed rosuvastatin and simvastatin due to myalgias.  She is now on pravastatin doing great with additional co-Q10 daily.  She exercises regularly mainly with walking.  She had a cardiac catheterization with minimal disease.  The question is whether she might have small vessel disease.  Her description of her chest pressure today is atypical.   She will get it with exertion and walking but if she rests briefly she can then walk as long as he wants without any difficulty.  Annual wellness visit today January 2025.  She will have her annual preventive exam in October 2025.  She is due for Tdap.  Lipids performed today and every 6 months.  She has dyspnea on exertion but a near normal heart catheterization.  It sounds like her symptoms may be more pulmonary than cardiac.  She has tried an inhaler without benefit.  Will boost pravastatin dose to 20 mg daily from 10 mg daily today.  Her sister who is very younger and has a similar history regarding lipids and lack of smoking just had a heart attack and four-vessel bypass.    The above information was reviewed again today 01/28/25.  It continues to be accurate as reflected above and is unchanged.  History, physical and review of systems all reviewed and are unchanged.  Medications were reviewed today and continue the current dosing.               Dragon disclaimer:   Part of this note may be an electronic transcription/translation of spoken language to printed text using the Dragon Dictation System.

## 2025-03-20 ENCOUNTER — TELEPHONE (OUTPATIENT)
Dept: CARDIOLOGY | Facility: CLINIC | Age: 70
End: 2025-03-20
Payer: MEDICARE

## 2025-03-20 NOTE — TELEPHONE ENCOUNTER
Caller: Sonia Basilio    Relationship to patient: Self    Best call back number: [phone]     Patient is needing: PT IS REQUESTING TO SPEAK TO JACQUELINE WALTERS ABOUT RANOLAZINE. PT IS SEARCHING FOR NEW INSURANCES BUT THE PLAN SHE IS INTERESTED IN IS REJECTING HER SINCE SHE IS TAKING THIS MEDICATION. PLS CALL & ADVISE AT EARLIEST CONVEYANCE.

## 2025-03-21 NOTE — TELEPHONE ENCOUNTER
Cardiac catheterization from 2022 demonstrated mild nonobstructive disease of the RCA. She was intolerant of isosorbide because of headaches, so we have been treating her medically for small vessel disease with Ranexa. She can stop it and see what happens.

## 2025-04-22 RX ORDER — PRAVASTATIN SODIUM 10 MG
10 TABLET ORAL DAILY
Qty: 90 TABLET | Refills: 0 | Status: SHIPPED | OUTPATIENT
Start: 2025-04-22

## 2025-06-06 ENCOUNTER — OFFICE VISIT (OUTPATIENT)
Age: 70
End: 2025-06-06
Payer: MEDICARE

## 2025-06-06 VITALS
WEIGHT: 139 LBS | DIASTOLIC BLOOD PRESSURE: 62 MMHG | SYSTOLIC BLOOD PRESSURE: 114 MMHG | HEIGHT: 67 IN | HEART RATE: 72 BPM | BODY MASS INDEX: 21.82 KG/M2

## 2025-06-06 DIAGNOSIS — E78.5 HYPERLIPIDEMIA LDL GOAL <70: ICD-10-CM

## 2025-06-06 DIAGNOSIS — R79.9 ABNORMAL FINDING OF BLOOD CHEMISTRY, UNSPECIFIED: ICD-10-CM

## 2025-06-06 DIAGNOSIS — I25.10 CORONARY ARTERY DISEASE INVOLVING NATIVE CORONARY ARTERY OF NATIVE HEART WITHOUT ANGINA PECTORIS: ICD-10-CM

## 2025-06-06 DIAGNOSIS — I34.1 MVP (MITRAL VALVE PROLAPSE): Primary | ICD-10-CM

## 2025-06-06 PROCEDURE — 93000 ELECTROCARDIOGRAM COMPLETE: CPT | Performed by: INTERNAL MEDICINE

## 2025-06-06 PROCEDURE — 99214 OFFICE O/P EST MOD 30 MIN: CPT | Performed by: INTERNAL MEDICINE

## 2025-06-06 RX ORDER — AA/PROT/LYSINE/METHIO/VIT C/B6 50-12.5 MG
5 TABLET ORAL DAILY
COMMUNITY

## 2025-06-06 RX ORDER — PROGESTERONE 100 MG/1
100 CAPSULE ORAL DAILY
COMMUNITY

## 2025-06-06 NOTE — PROGRESS NOTES
Date of Office Visit: 25  Encounter Provider: Matt Jane MD  Place of Service: [facility] CARDIOLOGY  Patient Name: Sonia Basilio  :1955  [id number]    Chief Complaint   Patient presents with    Coronary Artery Disease   :     HPI: Sonia Basilio is a 70 y.o. female in  she had chest pain and it seemed like it was angina we took her to the Cath Lab she had normal LV function mild luminal irregularities of her coronaries I started her on some nitrates which she was unable to tolerate and then she was put on Ranexa.  She retired from [employer] a [employer] and all of her symptoms went away.  She stopped the Ranexa because she did not seem to think it was helping.  When she took simvastatin she felt like her legs were weak so she did not stay on that she has been on Pravachol her last HDL was 83 and her LDL was 158 that was on 10 of Pravachol.  She has not had chest pain shortness of breath PND orthopnea edema she has still symptoms of menopause where she has hot flashes she is also having difficulty sleeping she recently got started on progesterone but they wanted to ask us about whether would be okay for her to be on estrogen    Past Medical History:   Diagnosis Date    Allergic rhinitis     H/O degenerative disc disease     Medication management     MVP (mitral valve prolapse)     Osteopenia     Physical exam, annual     Sciatica     Vitamin D deficiency        Past Surgical History:   Procedure Laterality Date    BUNIONECTOMY Right 2015    CARDIAC CATHETERIZATION N/A 2022    Procedure: Left Heart Cath;  Surgeon: Matt Jane MD;  Location: [facility] CATH INVASIVE LOCATION;  Service: Cardiology;  Laterality: N/A;    CARDIAC CATHETERIZATION N/A 2022    Procedure: Coronary angiography;  Surgeon: Matt Jane MD;  Location:  [facility] CATH INVASIVE LOCATION;  Service: Cardiology;  Laterality: N/A;    CARDIAC CATHETERIZATION N/A 2022     Procedure: Left ventriculography;  Surgeon: Matt Jane MD;  Location: [facility] CATH INVASIVE LOCATION;  Service: Cardiology;  Laterality: N/A;    COLONOSCOPY N/A 4/3/2018    Procedure: COLONOSCOPY WITH POLYPECTOMY (HOT SNARE);  Surgeon: Naina Pandya MD;  Location: [facility] ENDOSCOPY;  Service: Gastroenterology    DILATATION AND CURETTAGE      SKIN CANCER EXCISION Left     ARM       Social History     Socioeconomic History    Marital status:    Tobacco Use    Smoking status: Never     Passive exposure: Never    Smokeless tobacco: Never   Vaping Use    Vaping status: Never Used   Substance and Sexual Activity    Alcohol use: Yes     Comment: OCCASIONALLY    Drug use: No    Sexual activity: Defer       Family History   Problem Relation Age of Onset    Stroke Mother     Dementia Mother     Hypertension Father     Atrial fibrillation Sister     Hyperlipidemia Sister     Heart disease Sister     Alcohol abuse Son     Colon cancer Paternal Grandmother     Heart attack Paternal Grandmother        Review of Systems   Constitutional: Negative for decreased appetite, fever, malaise/fatigue and weight loss.   HENT:  Negative for nosebleeds.    Eyes:  Negative for double vision.   Cardiovascular:  Negative for chest pain, claudication, cyanosis, dyspnea on exertion, irregular heartbeat, leg swelling, near-syncope, orthopnea, palpitations, paroxysmal nocturnal dyspnea and syncope.   Respiratory:  Negative for cough, hemoptysis and shortness of breath.    Hematologic/Lymphatic: Negative for bleeding problem.   Skin:  Negative for rash.   Musculoskeletal:  Negative for falls and myalgias.   Gastrointestinal:  Negative for hematochezia, jaundice, melena, nausea and vomiting.   Genitourinary:  Negative for hematuria.   Neurological:  Negative for dizziness and seizures.   Psychiatric/Behavioral:  Negative for altered mental status and memory loss.        Allergies   Allergen Reactions    Isosorbide Headache    Sulfa  "Antibiotics Rash         Current Outpatient Medications:     ASPIRIN 81 PO, Take  by mouth., Disp: , Rfl:     Cholecalciferol (VITAMIN D3) 5000 units capsule capsule, Take 1 capsule by mouth Daily., Disp: , Rfl:     Coenzyme Q10 30 MG/5ML liquid, Take 5 mL by mouth Daily., Disp: , Rfl:     Multiple Vitamins-Minerals (MULTIVITAMIN & MINERAL PO), Take 1 tablet by mouth Daily., Disp: , Rfl:     Omega-3 Fatty Acids (FISH OIL PO), Take  by mouth., Disp: , Rfl:     pravastatin (PRAVACHOL) 20 MG tablet, Take 1 tablet by mouth Daily., Disp: 90 tablet, Rfl: 3    Progesterone (PROMETRIUM) 100 MG capsule, Take 1 capsule by mouth Daily., Disp: , Rfl:     vitamin C (ASCORBIC ACID) 250 MG tablet, Take 1 tablet by mouth Daily., Disp: , Rfl:       Objective:     Vitals:    06/06/25 1020   BP: 114/62   Pulse: 72   Weight: 63 kg (139 lb)   Height: 170.2 cm (67\")     Body mass index is 21.77 kg/m².    Constitutional:       Appearance: Well-developed.   Eyes:      General: No scleral icterus.  HENT:      Head: Normocephalic.   Neck:      Thyroid: No thyromegaly.      Vascular: No JVD.      Lymphadenopathy: No cervical adenopathy.   Pulmonary:      Effort: Pulmonary effort is normal.      Breath sounds: Normal breath sounds. No wheezing. No rales.   Cardiovascular:      Normal rate. Regular rhythm.      No gallop.    Edema:     Peripheral edema absent.   Abdominal:      Palpations: Abdomen is soft.      Tenderness: There is no abdominal tenderness.   Musculoskeletal: Normal range of motion. Skin:     General: Skin is warm and dry.      Findings: No rash.   Neurological:      Mental Status: Alert and oriented to person, place, and time.           ECG 12 Lead    Date/Time: 6/6/2025 10:50 AM  Performed by: Matt Jane MD    Authorized by: Matt Jane MD  Comparison: compared with previous ECG   Similar to previous ECG  Rhythm: sinus rhythm    Clinical impression: normal ECG           Assessment:       Diagnosis Plan   1. MVP " (mitral valve prolapse)        2. Coronary artery disease involving native coronary artery of native heart without angina pectoris  Lipoprotein A (LPA)    Apolipoprotein B    Hemoglobin A1c    Comprehensive Metabolic Panel    Lipid Panel      3. Hyperlipidemia LDL goal <70  Lipoprotein A (LPA)    Apolipoprotein B    Hemoglobin A1c    Comprehensive Metabolic Panel    Lipid Panel      4. Abnormal finding of blood chemistry, unspecified  Hemoglobin A1c             Plan:       I think she is doing well overall she does have mild coronary disease there is a family history of coronary disease I think we want to try and be aggressive with her lipids I doubt Pravachol is going to get it done.  I did recommend she could take CoQ 10 that might help with her myalgias I am going to set her up to get her lipids checked some lipid particles and an A1c.  But the goal is an LDL under 55 for her and that can require rosuvastatin and likely Zetia.  I think she would be a great person to take estrogen my review of that data is there is a very small risk of breast cancer there is a risk of thromboembolism but not really much in the way of coronary disease and I think she will feel a lot better with that and I would endorse that.  I am not going to change anything else we will see what her labs are when they come back      Return for See Latrice Amaya in 1 year, See me in 2 years.     As always, it has been a pleasure to participate in your patient's care.      Sincerely,       Matt Jane MD

## 2025-06-10 ENCOUNTER — LAB (OUTPATIENT)
Dept: LAB | Facility: HOSPITAL | Age: 70
End: 2025-06-10
Payer: MEDICARE

## 2025-06-10 PROCEDURE — 80061 LIPID PANEL: CPT | Performed by: INTERNAL MEDICINE

## 2025-06-10 PROCEDURE — 80053 COMPREHEN METABOLIC PANEL: CPT | Performed by: INTERNAL MEDICINE

## 2025-06-10 PROCEDURE — 83695 ASSAY OF LIPOPROTEIN(A): CPT | Performed by: INTERNAL MEDICINE

## 2025-06-10 PROCEDURE — 83036 HEMOGLOBIN GLYCOSYLATED A1C: CPT | Performed by: INTERNAL MEDICINE

## 2025-06-10 PROCEDURE — 82172 ASSAY OF APOLIPOPROTEIN: CPT | Performed by: INTERNAL MEDICINE

## 2025-06-12 ENCOUNTER — TELEPHONE (OUTPATIENT)
Dept: CARDIOLOGY | Age: 70
End: 2025-06-12
Payer: MEDICARE

## 2025-06-12 NOTE — TELEPHONE ENCOUNTER
Caller: Sonia Basilio    Relationship: Self    Best call back number:     PATIENT IS NEEDING SOME PAPERWORK FILLED OUT, WHAT WAS DISCUSSED YESTERDAY.    SHE SAID SHE CAN SEND IT IN AN EMAIL OR DROP IT OFF AT THE OFFICE.    PLEASE REACH OUT TO HER TO LET HER KNOW.

## 2025-06-13 ENCOUNTER — TELEPHONE (OUTPATIENT)
Dept: CARDIOLOGY | Age: 70
End: 2025-06-13
Payer: MEDICARE

## 2025-06-13 NOTE — TELEPHONE ENCOUNTER
Caller: Sonia Basilio    Relationship to patient: Self    Best call back number: [phone]    Patient is needing: PT STATES THEY HAVE AN INSURANCE FORM THAT NEEDS TO BE FILLED OUT REGARDING THEIR HEALTH. WOULD LIKE TO HAVE THIS DONE BEFORE NEXT FRIDAY. PT WOULD LIKE TO KNOW IF JESSICA WOULD LIKE THIS SENT VIA E-MAIL OR IF JESSICA WOULD LIKE TO SPEAK WITH THEM FIRST. PT STATES THEY CAN ALSO DROP THIS OFF AT THE OFFICE.

## 2025-06-13 NOTE — TELEPHONE ENCOUNTER
I called pt LVM that she can send papers through my chart or drop them off at office on the 5 floor.  I did give her our fax number if she was able to fax the papers over .   I did ask her to call back to let me know which is easiest for her.   Fidel INMAN

## 2025-06-24 NOTE — TELEPHONE ENCOUNTER
This Paper has been completed last week and pt picked it up papers.    I called pt to verify that she did pick it up, she states it already approved from insurance.  Thanks Fidel INMAN

## 2025-07-16 RX ORDER — PRAVASTATIN SODIUM 10 MG
10 TABLET ORAL DAILY
Qty: 90 TABLET | Refills: 0 | Status: SHIPPED | OUTPATIENT
Start: 2025-07-16

## 2025-07-18 ENCOUNTER — TELEPHONE (OUTPATIENT)
Dept: INTERNAL MEDICINE | Facility: CLINIC | Age: 70
End: 2025-07-18

## 2025-07-29 DIAGNOSIS — E78.5 HYPERLIPIDEMIA LDL GOAL <70: Primary | ICD-10-CM

## 2025-07-29 DIAGNOSIS — I25.10 CORONARY ARTERY DISEASE INVOLVING NATIVE CORONARY ARTERY OF NATIVE HEART WITHOUT ANGINA PECTORIS: ICD-10-CM

## 2025-07-29 NOTE — TELEPHONE ENCOUNTER
Called and left VM, will continue to try to reach pt.    HUB- please put patient straight through to triage    Stephanie Solano, RN  Triage RN  07/29/25 13:28 EDT

## 2025-07-30 NOTE — TELEPHONE ENCOUNTER
Patient called back.  I have discussed this message with pt and she verbalizes understanding and agrees with plan.  She is aware of where lab is and the process.  She was asked to be fasting.    Aleah Bansal RN  [facility] Triage  07/30/25 11:13 EDT

## 2025-07-31 ENCOUNTER — LAB (OUTPATIENT)
Dept: LAB | Facility: HOSPITAL | Age: 70
End: 2025-07-31
Payer: MEDICARE

## 2025-07-31 PROCEDURE — 80061 LIPID PANEL: CPT | Performed by: NURSE PRACTITIONER

## 2025-07-31 PROCEDURE — 80076 HEPATIC FUNCTION PANEL: CPT | Performed by: NURSE PRACTITIONER

## 2025-07-31 RX ORDER — ROSUVASTATIN CALCIUM 40 MG/1
40 TABLET, COATED ORAL DAILY
Qty: 90 TABLET | Refills: 3 | Status: SHIPPED | OUTPATIENT
Start: 2025-07-31

## 2025-07-31 NOTE — TELEPHONE ENCOUNTER
I called and spoke with Sonia.  She is no longer on pravastatin.  I will discontinue it from her list.  She is agreeable to increase rosuvastatin from 20 to 40 mg.  She will double up her current tablets and take two until she runs out.  She requests a new script for a 40 mg tablet be sent to [facility] on file.  I will pend for review and signature.    She has also been researching Mediterranean diet and asked for resources/recipes and I have advised her to check [email or web address].  She is appreciative of the information.    Aleah Bansal RN  [facility] Triage  07/31/25 10:28 EDT

## (undated) DEVICE — GW EMR FIX EXCHG J STD .035 3MM 260CM

## (undated) DEVICE — THE SINGLE USE ETRAP – POLYP TRAP IS USED FOR SUCTION RETRIEVAL OF ENDOSCOPICALLY REMOVED POLYPS.: Brand: ETRAP

## (undated) DEVICE — PAD GRND REM POLYHESIVE A/ DISP

## (undated) DEVICE — CATH DIAG IMPULSE FR4 5F 100CM

## (undated) DEVICE — PK CATH CARD 40

## (undated) DEVICE — CANN NASL CO2 TRULINK W/O2 A/

## (undated) DEVICE — THE CARR-LOCKE INJECTION NEEDLE IS A SINGLE USE, DISPOSABLE, FLEXIBLE SHEATH INJECTION NEEDLE USED FOR THE INJECTION OF VARIOUS TYPES OF MEDIA THROUGH FLEXIBLE ENDOSCOPES.

## (undated) DEVICE — GLIDESHEATH BASIC HYDROPHILIC COATED INTRODUCER SHEATH: Brand: GLIDESHEATH

## (undated) DEVICE — SNAR POLYP SENSATION STDOVL 27 240 BX40

## (undated) DEVICE — Device: Brand: DEFENDO AIR/WATER/SUCTION AND BIOPSY VALVE

## (undated) DEVICE — CATH DIAG IMPULSE FL3.5 5F 100CM

## (undated) DEVICE — THE TORRENT IRRIGATION SCOPE CONNECTOR IS USED WITH THE TORRENT IRRIGATION TUBING TO PROVIDE IRRIGATION FLUIDS SUCH AS STERILE WATER DURING GASTROINTESTINAL ENDOSCOPIC PROCEDURES WHEN USED IN CONJUNCTION WITH AN IRRIGATION PUMP (OR ELECTROSURGICAL UNIT).: Brand: TORRENT

## (undated) DEVICE — KT MANIFLD CARDIAC

## (undated) DEVICE — TUBING, SUCTION, 1/4" X 10', STRAIGHT: Brand: MEDLINE

## (undated) DEVICE — CATH VENT MIV RADL PIG ST TIP 4F 110CM